# Patient Record
Sex: FEMALE | Race: BLACK OR AFRICAN AMERICAN | NOT HISPANIC OR LATINO | Employment: FULL TIME | URBAN - METROPOLITAN AREA
[De-identification: names, ages, dates, MRNs, and addresses within clinical notes are randomized per-mention and may not be internally consistent; named-entity substitution may affect disease eponyms.]

---

## 2020-06-11 ENCOUNTER — HOSPITAL ENCOUNTER (EMERGENCY)
Facility: HOSPITAL | Age: 55
Discharge: HOME | End: 2020-06-11
Attending: EMERGENCY MEDICINE
Payer: COMMERCIAL

## 2020-06-11 VITALS
SYSTOLIC BLOOD PRESSURE: 147 MMHG | TEMPERATURE: 98 F | OXYGEN SATURATION: 98 % | BODY MASS INDEX: 40.4 KG/M2 | WEIGHT: 228 LBS | HEIGHT: 63 IN | RESPIRATION RATE: 18 BRPM | HEART RATE: 87 BPM | DIASTOLIC BLOOD PRESSURE: 73 MMHG

## 2020-06-11 DIAGNOSIS — M79.675 PAIN OF TOE OF LEFT FOOT: Primary | ICD-10-CM

## 2020-06-11 LAB — POCT GLUCOSE: 131 MG/DL (ref 70–110)

## 2020-06-11 PROCEDURE — 82962 GLUCOSE BLOOD TEST: CPT | Mod: ER

## 2020-06-11 PROCEDURE — 99284 EMERGENCY DEPT VISIT MOD MDM: CPT | Mod: 25,ER

## 2020-06-11 RX ORDER — IBUPROFEN 600 MG/1
600 TABLET ORAL EVERY 6 HOURS PRN
Qty: 12 TABLET | Refills: 0 | Status: SHIPPED | OUTPATIENT
Start: 2020-06-11 | End: 2020-07-21 | Stop reason: ALTCHOICE

## 2020-06-11 RX ORDER — ACETAMINOPHEN 325 MG/1
650 TABLET ORAL EVERY 6 HOURS PRN
Qty: 20 TABLET | Refills: 0 | OUTPATIENT
Start: 2020-06-11 | End: 2024-01-07

## 2020-06-11 NOTE — ED PROVIDER NOTES
Encounter Date: 6/11/2020    SCRIBE #1 NOTE: I, Sun Barroso, am scribing for, and in the presence of,  Dr. Crews. I have scribed the following portions of the note - Other sections scribed: HPI, ROS, PE.       History     Chief Complaint   Patient presents with    Toe Pain     started last friday. states tried home remedies (soaking with epsom salt and eucalyptus) without relief. LEFT GREAT TOE, c/o of swelling and pain denies trauma.      Sofia Dunbar is a 54 y.o. female who presents to the ED complaining of left great toe pain x 1 week. Tried soaking her feet in epsom salt with no relief. Denies injury or trauma. Reports she works at [employer] and is on her feet all day. Wears 6 month old tennis shoes. Denies fever, CP, SOB, nausea, and vomiting.  Pain 6 at 10, nonradiating, right toe, worse with walking on it, relieved with not walking on it.  Patient is not take anything to try to help out with the pain.  No fevers chills.    The history is provided by the patient. No  was used.     Review of patient's allergies indicates:   Allergen Reactions    Iodine and iodide containing products Swelling    Shellfish containing products Swelling     History reviewed. No pertinent past medical history.  No past surgical history on file.  No family history on file.  Social History     Tobacco Use    Smoking status: Current Some Day Smoker   Substance Use Topics    Alcohol use: Yes     Comment: social    Drug use: Not on file     Review of Systems   Constitutional: Negative for fever.   HENT: Negative for sore throat.    Respiratory: Negative for shortness of breath.    Cardiovascular: Negative for chest pain.   Gastrointestinal: Negative for nausea and vomiting.   Genitourinary: Negative for dysuria.   Musculoskeletal: Positive for arthralgias. Negative for back pain.   Skin: Negative for rash.   Neurological: Negative for weakness.   Hematological: Does not bruise/bleed easily.   All other  systems reviewed and are negative.      Physical Exam     Initial Vitals [06/11/20 1043]   BP Pulse Resp Temp SpO2   (!) 147/73 87 18 98.2 °F (36.8 °C) 98 %      MAP       --         Physical Exam    Nursing note and vitals reviewed.  Constitutional: She appears well-developed and well-nourished.   HENT:   Head: Normocephalic and atraumatic.   Eyes: EOM are normal. Pupils are equal, round, and reactive to light.   Neck: Normal range of motion. No JVD present.   Cardiovascular: Normal rate and regular rhythm.   Pulmonary/Chest: Breath sounds normal. No respiratory distress.   Abdominal: Soft. Bowel sounds are normal. She exhibits no distension.   Musculoskeletal: Normal range of motion. She exhibits no edema.   Tenderness to the left great toe with mild swelling.  Callus formation on the left great toe on the medial aspect which is significantly larger than on the right foot.  Full range of motion.  Good cap refill.  Does not feel warmer to the touch to the on the other side.  No fluctuance.  No abscess.  Does not show any cellulitis.   Neurological: She is alert and oriented to person, place, and time. She has normal strength. GCS score is 15. GCS eye subscore is 4. GCS verbal subscore is 5. GCS motor subscore is 6.   Skin: Skin is warm. Capillary refill takes less than 2 seconds.   Psychiatric: She has a normal mood and affect. Thought content normal.         ED Course   Procedures  Labs Reviewed   POCT GLUCOSE - Abnormal; Notable for the following components:       Result Value    POCT Glucose 131 (*)     All other components within normal limits          Imaging Results          X-Ray Toe 2 or More Views Left (Final result)  Result time 06/11/20 11:08:53    Final result by Israel Henderson MD (06/11/20 11:08:53)                 Impression:      No fracture identified.      Electronically signed by: Israel Henderson MD  Date:    06/11/2020  Time:    11:08             Narrative:    EXAMINATION:  XR TOE 2 OR MORE VIEWS  LEFT    CLINICAL HISTORY:  pain;    TECHNIQUE:  Three views of the left toes were performed    COMPARISON:  None.    FINDINGS:  The alignment is within normal limits.  No fracture.  No marrow replacement process.  No radiopaque foreign body.                                 Medical Decision Making:   History:   Old Medical Records: I decided to obtain old medical records.  Initial Assessment:   54-year-old female coming in secondary to toe pain.  I think this is likely some arthritis in the toe secondary to walking for long periods of time with older shoes that are not giving her proper support.  X-ray reassuring.  IM ketorolac for pain.  No signs of cellulitis, abscess, paronychia, felon, septic joint.  Good cap refill.  Neurovascular intact so I do not think any arterial venous compromise.  Discharged with ibuprofen and Tylenol.  Patient is going to the store after this to change her shoes. I discussed with the patient the diagnosis, treatment plan, indications for return to the emergency department, and for expected follow-up. The patient verbalized an understanding. The patient is asked if there are any questions or concerns. We discuss the case, until all issues are addressed to the patient's satisfaction. Patient understands and is agreeable to the plan.   Earnest Crews    Clinical Tests:   Lab Tests: Ordered and Reviewed  Radiological Study: Ordered and Reviewed            Scribe Attestation:   Scribe #1: I performed the above scribed service and the documentation accurately describes the services I performed. I attest to the accuracy of the note.    I, Earnest Crews, personally performed the services described in this documentation. All medical record entries made by the scribe were at my direction and in my presence. I have reviewed the chart and agree that the record reflects my personal performance and is accurate and complete.                        Clinical Impression:     1. Pain of toe of left foot                 ED Disposition Condition    Discharge Stable        ED Prescriptions     Medication Sig Dispense Start Date End Date Auth. Provider    ibuprofen (ADVIL,MOTRIN) 600 MG tablet Take 1 tablet (600 mg total) by mouth every 6 (six) hours as needed. 12 tablet 6/11/2020  Earnest Crews MD    acetaminophen (TYLENOL) 325 MG tablet Take 2 tablets (650 mg total) by mouth every 6 (six) hours as needed. 20 tablet 6/11/2020  Earnest Crews MD        Follow-up Information     Follow up With Specialties Details Why Contact Info    [facility] Schedule an appointment as soon as possible for a visit in 2 days  [address] [facility] [address] [phone]      Kathy Alvarenga DPM Podiatry, Wound Care Schedule an appointment as soon as possible for a visit in 2 days  [address] [facility] [id number] [phone]                                       Earnest Crews MD  06/11/20 8834

## 2020-07-21 ENCOUNTER — OFFICE VISIT (OUTPATIENT)
Dept: PODIATRY | Facility: CLINIC | Age: 55
End: 2020-07-21
Payer: COMMERCIAL

## 2020-07-21 VITALS — WEIGHT: 228 LBS | BODY MASS INDEX: 40.4 KG/M2 | HEIGHT: 63 IN

## 2020-07-21 DIAGNOSIS — M25.572 ARTHRALGIA OF FOOT, LEFT: ICD-10-CM

## 2020-07-21 DIAGNOSIS — M79.672 FOOT PAIN, LEFT: Primary | ICD-10-CM

## 2020-07-21 DIAGNOSIS — M21.6X2 ACQUIRED EQUINUS DEFORMITY OF BOTH FEET: ICD-10-CM

## 2020-07-21 DIAGNOSIS — M20.5X2 HALLUX LIMITUS, ACQUIRED, LEFT: ICD-10-CM

## 2020-07-21 DIAGNOSIS — M21.6X1 ACQUIRED EQUINUS DEFORMITY OF BOTH FEET: ICD-10-CM

## 2020-07-21 DIAGNOSIS — M20.5X1 HALLUX LIMITUS, ACQUIRED, RIGHT: ICD-10-CM

## 2020-07-21 PROCEDURE — 99203 OFFICE O/P NEW LOW 30 MIN: CPT | Mod: S$GLB,,, | Performed by: PODIATRIST

## 2020-07-21 PROCEDURE — 99203 PR OFFICE/OUTPT VISIT, NEW, LEVL III, 30-44 MIN: ICD-10-PCS | Mod: S$GLB,,, | Performed by: PODIATRIST

## 2020-07-21 PROCEDURE — 3008F BODY MASS INDEX DOCD: CPT | Mod: CPTII,S$GLB,, | Performed by: PODIATRIST

## 2020-07-21 PROCEDURE — 99999 PR PBB SHADOW E&M-EST. PATIENT-LVL IV: CPT | Mod: PBBFAC,,, | Performed by: PODIATRIST

## 2020-07-21 PROCEDURE — 3008F PR BODY MASS INDEX (BMI) DOCUMENTED: ICD-10-PCS | Mod: CPTII,S$GLB,, | Performed by: PODIATRIST

## 2020-07-21 PROCEDURE — 99999 PR PBB SHADOW E&M-EST. PATIENT-LVL IV: ICD-10-PCS | Mod: PBBFAC,,, | Performed by: PODIATRIST

## 2020-07-21 RX ORDER — MELOXICAM 15 MG/1
15 TABLET ORAL DAILY
Qty: 30 TABLET | Refills: 1 | Status: SHIPPED | OUTPATIENT
Start: 2020-07-21 | End: 2021-07-21

## 2020-07-21 NOTE — PROGRESS NOTES
Subjective:      Patient ID: Sofia Dunbar is a 54 y.o. female.    Chief Complaint: Foot Problem (left foot ) and Follow-up      Sofia Dunbar is a 54 y.o. female who presents to the podiatry clinic  with complaint of  left sharp and aching foot pain, especially with prolonged standing in certain shoes.  She relates that pain began in late May and she presented to the emergency room with a painful swollen great toe joint on 06/11/2020. Reports she works at [employer] and is on her feet all day. Wears 6 month old tennis shoes. Denies fever, CP, SOB, nausea, and vomiting.  Precipitating event: none known.  History of injury: no  Symptoms have gradually improved. Patient has had no prior foot problems. Patients rates pain 0/10 on pain scale.    Shoe gear: Slip-on shoes  Hours on Feet: 8+    Patient Active Problem List   Diagnosis    CHF (congestive heart failure)    Essential hypertension    Pure hypercholesterolemia    Morbid obesity with BMI of 40.0-44.9, adult    Tobacco use       Current Outpatient Medications on File Prior to Visit   Medication Sig Dispense Refill    acetaminophen (TYLENOL) 325 MG tablet Take 2 tablets (650 mg total) by mouth every 6 (six) hours as needed. 20 tablet 0    amlodipine (NORVASC) 5 MG tablet       aspirin (ECOTRIN) 81 MG EC tablet Take 81 mg by mouth once daily.      atorvastatin (LIPITOR) 20 MG tablet       carvedilol (COREG) 12.5 MG tablet       furosemide (LASIX) 20 MG tablet       potassium chloride (KLOR-CON) 10 MEQ TbSR Take 20 mEq by mouth once daily.      [DISCONTINUED] ibuprofen (ADVIL,MOTRIN) 600 MG tablet Take 1 tablet (600 mg total) by mouth every 6 (six) hours as needed. 12 tablet 0     No current facility-administered medications on file prior to visit.        Review of patient's allergies indicates:   Allergen Reactions    Iodine and iodide containing products Swelling    Shellfish containing products Swelling       History reviewed. No pertinent surgical  "history.    History reviewed. No pertinent family history.    Social History     Socioeconomic History    Marital status: Single     Spouse name: Not on file    Number of children: Not on file    Years of education: Not on file    Highest education level: Not on file   Occupational History    Not on file   Social Needs    Financial resource strain: Not on file    Food insecurity     Worry: Not on file     Inability: Not on file    Transportation needs     Medical: Not on file     Non-medical: Not on file   Tobacco Use    Smoking status: Current Some Day Smoker   Substance and Sexual Activity    Alcohol use: Yes     Comment: social    Drug use: Not on file    Sexual activity: Not on file   Lifestyle    Physical activity     Days per week: Not on file     Minutes per session: Not on file    Stress: Not on file   Relationships    Social connections     Talks on phone: Not on file     Gets together: Not on file     Attends [employer] service: Not on file     Active member of club or organization: Not on file     Attends meetings of clubs or organizations: Not on file     Relationship status: Not on file   Other Topics Concern    Not on file   Social History Narrative    Not on file       Review of Systems   Constitution: Negative for chills and fever.   Cardiovascular: Negative for claudication and leg swelling.   Respiratory: Negative for cough and shortness of breath.    Skin: Positive for dry skin and nail changes. Negative for itching and rash.   Musculoskeletal: Positive for arthritis, back pain, joint pain and myalgias. Negative for falls, joint swelling and muscle weakness.   Gastrointestinal: Negative for diarrhea, nausea and vomiting.   Neurological: Positive for paresthesias. Negative for numbness, tremors and weakness.   Psychiatric/Behavioral: Negative for altered mental status and hallucinations.           Objective:      Vitals:    07/21/20 1415   Weight: 103.4 kg (228 lb)   Height: 5' 3" " (1.6 m)   PainSc: 0-No pain       Physical Exam  Nursing note reviewed.   Constitutional:       General: She is not in acute distress.     Appearance: She is not toxic-appearing or diaphoretic.   Cardiovascular:      Pulses:           Dorsalis pedis pulses are 2+ on the right side and 2+ on the left side.        Posterior tibial pulses are 2+ on the right side and 2+ on the left side.   Pulmonary:      Effort: No respiratory distress.   Musculoskeletal:      Right ankle: She exhibits decreased range of motion. She exhibits no swelling. No tenderness. No lateral malleolus, no medial malleolus, no AITFL, no CF ligament and no posterior TFL tenderness found. Achilles tendon exhibits no pain, no defect and normal Camarena's test results.      Left ankle: She exhibits decreased range of motion. She exhibits no swelling. No tenderness. No lateral malleolus, no medial malleolus, no AITFL, no CF ligament and no posterior TFL tenderness found. Achilles tendon exhibits no pain, no defect and normal Camarena's test results.      Right foot: No bony tenderness.      Left foot: No bony tenderness.      Comments: There is equinus deformity bilateral with decreased dorsiflexion at the ankle joint bilateral.  Shoes reveals lateral heel counter wear bilateral     Decreased first MPJ range of motion both weightbearing and nonweightbearing, no crepitus observed the first MP joint, + dorsal flag sign. Mild  bunion deformity is observed .    Patient has hammertoes of digits 2-5 bilateral partially reducible      Skin:     General: Skin is warm and dry.      Coloration: Skin is not pale.      Findings: No bruising, burn, laceration, lesion or rash.      Nails: There is no clubbing.     Neurological:      Sensory: No sensory deficit.      Motor: No tremor, atrophy or abnormal muscle tone.      Deep Tendon Reflexes: Reflexes are normal and symmetric.   Psychiatric:         Attention and Perception: She is attentive.         Mood and  Affect: Mood is not anxious. Affect is not inappropriate.         Speech: She is communicative. Speech is not slurred.         Behavior: Behavior is not combative.               Assessment:       Encounter Diagnoses   Name Primary?    Foot pain, left Yes    Hallux limitus, acquired, left     Hallux limitus, acquired, right     Arthralgia of foot, left     Acquired equinus deformity of both feet          Plan:       Sofia was seen today for foot problem and follow-up.    Diagnoses and all orders for this visit:    Foot pain, left    Hallux limitus, acquired, left    Hallux limitus, acquired, right    Arthralgia of foot, left  -     Ambulatory referral/consult to Physical/Occupational Therapy; Future    Acquired equinus deformity of both feet    Other orders  -     meloxicam (MOBIC) 15 MG tablet; Take 1 tablet (15 mg total) by mouth once daily. 1 pill per day everyday for the next 3 weeks with food      I counseled the patient on her conditions, their implications and medical management.    Personally interpreted and reviewed x-ray with patient.    I did  the patient in detail regarding surgical and conservative treatment measures for hallux limitus. I informed the  patient that the majority of pain is secondary to an arthritic joint with decreased joint spaces. Informed patient that outside of surgical intervention the main goal of therapy is to decreased the  range of motion at the first MPJ joint. This can be done so by utilizing either and extremely hard soled nonflexible shoe or forefoot rocker    I gave written and verbal instructions on stretching exercises. Patient expressed understanding. Discussed icing the affected area as needed and also wearing appropriate shoe gear and avoiding flats, slippers, sandals, and going barefoot. My recommendation for OTC supports is [employer]otic[employer]. Patient instructed on adequate icing techniques. Patient should ice the affected area at least once per day x  10 minutes for 10 days I advised the patient that extra icing would also be beneficial to ensure adequate anti inflammatory effect. We also discussed cortisone injections and NSAID therapy. Mobic prescribed. Patient was instructed on dosing information. Discontinue if adverse effects occur     Referral placed to PT to aid in increasing ROM and breaking up of scar tissue. Dry needling requested    RTC if no improvement, at this time patient will receive cortisone injections. Patient is amenable to plan.

## 2020-07-21 NOTE — PATIENT INSTRUCTIONS
Recommend lotions: eucerin, eucerin for diabetics, aquaphor, A&D ointment, gold bond for diabetics, sween, Adair's Bees all purpose baby ointment,  urea 40 with aloe (found on amazon.com)    Shoe recommendations: (try 6pm.com, zappos.Crowdmark , nordstromrack.Crowdmark, or shoes.Crowdmark for discounted prices) you can visit DSW shoes in [place]  or [facility] in the [facility] (there are also several shoe brand outlets in the [facility])    Asics (GT 2000 or gel foundations), new balance stability type shoes (such as the 940 series), saucony (stabil c3),  Deleon (GTS or Beast or transcend), propet (tennis shoe)    Sofft Brand (women) Tarun&Emery (men), clarks, crocs, aerosoles, naturalizers, SAS, ecco, born, renny jennings, rockports (dress shoes)    Vionic, burkenstocks, fitflops, propet (sandals)  Nike comfort thong sandals, crocs, propet (house shoes)    Nail Home remedy:  Vicks Vapor rub to nails for easier manageability      Occasional soaks for 15-20 mins in luke warm water with 1/2 cup of listerine and 1 cup of apple cider vinegar are ok You may add several drops of oil of oregano or tea tree oil as well        What Is Arthritis in the Foot?  Degenerative arthritis is a condition that slowly wears away joints, the area where bones meet and move. In the beginning, you may notice that the affected joint seems stiff. It may even ache. As the joint lining (cartilage) breaks down, the bones rub against each other, causing pain and swelling. Over time, small pieces of rough or splintered bone (bone spurs) develop, and the joints range of motion becomes limited. But movement doesnt have to cause pain. The effects of arthritis can be reduced.    The big-toe joint  When arthritis affects your big toe, your foot hurts when it pushes off the ground. Arthritis often appears in the big-toe joint along with a bunion (a bony bump at the side of the joint) or a bone spur on top of the joint.    Other joints  When arthritis affects the rear  or midfoot joints, you feel pain when you put weight on your foot. Arthritis may affect the joint where the ankle and foot meet. It may also affect other joints nearby.  Date Last Reviewed: 7/1/2016 © 2000-2017 [employer]. [address] [place] [address], [place] [address]. All rights reserved. This information is not intended as a substitute for professional medical care. Always follow your healthcare professional's instructions.        Treating Arthritis in the Foot  If your symptoms are mild, medications may be enough to reduce pain and swelling. For more severe arthritis, surgery may be needed to improve the condition of the joint.    Medicine  Your doctor may prescribe medicine--pills or injections--to limit pain and swelling. Ice, aspirin, acetaminophen, or ibuprofen may help relieve mild symptoms that occur after activity.  Surgery and bone trimming  To ease movement and reduce pain, your doctor may trim damaged bone. If arthritis is severe, the joint may be fused or removed. If the bone is not damaged too badly, your doctor may simply shave away bone spurs. Any excess bone growth related to a bunion may also be trimmed.  Fusing joints  If damage is more severe, your doctor may fuse the joint to prevent the bones from rubbing. Afterward, staples, plates, or screws may hold the bones in place so they heal properly. In some cases, the joint may be removed and replaced with an implant.  After surgery  During the early stages of recovery, your foot is likely to be bandaged and immobilized for a while. For best results, follow up with your doctor as scheduled. These visits help ensure that your foot heals properly.  As you heal  After surgery, youll be told how to care for your incision and how soon to begin walking on the foot. Until the foot can bear weight, you may need to walk with crutches or a cane.  For surgery on the big toe, your foot may be splinted to limit movement for several weeks. Despite  this, you should be able to walk soon after surgery.  For surgery on rear or midfoot joints, you may need to wear a cast or surgical shoe. These joints are fairly large, so full recovery may take a few months. Once the bone has healed, any staples, plates, or screws may be removed.  Date Last Reviewed: 7/1/2016 © 2000-2017 [employer]. [address] [place] [address], [place] [address]. All rights reserved. This information is not intended as a substitute for professional medical care. Always follow your healthcare professional's instructions.        Foot Surgery: Degenerative Joint Disease    Degenerative joint disease (arthritis) often happens in the joint of a big toe. This bone growth may cause pain and stiffness in the joint. Left untreated, arthritis can break down the cartilage and destroy the joint. Your treatment choices depend on how damaged your joint is. There are many nonsurgical treatments, but if these are not helpful, surgery may be considered.    Cheilectomy  This is done when the arthritic joint and cartilage can be saved. A bone spur caused by arthritis may be symptomatic on the top of the big toe joint. The procedure involves removing this bone spur, usually with a small part of the top of the joint itself.  You will need to wear a surgical shoe for several weeks. Once the foot heals, joint movement is restored.    Fusion  In fusion, the cartilage and some bone on both sides of the joint are removed. Then, the big toe and metatarsal bones are held together with staples, screws, or a plate and screws. Your foot may be placed in a cast. While you heal, you will be asked not to bear weight on this foot. You may also need crutches for several weeks. Because the joint has been removed, your toe will be less flexible.    Arthroplasty  During surgery, bone growth caused by the arthritis is trimmed, and part of the joint is removed. A pin can be used to align the bones and to keep them from  touching. The pin is removed after several weeks. In some cases, the entire joint may be replaced with an implant. You may have to wear a splint or a surgical shoe for several weeks. When healed, the bones become connected with scar tissue.  Date Last Reviewed: 10/15/2015  © 1310-0949 [employer]. [address] [place] [address], [place] [address]. All rights reserved. This information is not intended as a substitute for professional medical care. Always follow your healthcare professional's instructions.

## 2021-03-26 ENCOUNTER — TELEPHONE (OUTPATIENT)
Dept: ENDOSCOPY | Facility: HOSPITAL | Age: 56
End: 2021-03-26

## 2021-04-16 ENCOUNTER — PATIENT MESSAGE (OUTPATIENT)
Dept: RESEARCH | Facility: HOSPITAL | Age: 56
End: 2021-04-16

## 2021-04-27 ENCOUNTER — TELEPHONE (OUTPATIENT)
Dept: ENDOSCOPY | Facility: HOSPITAL | Age: 56
End: 2021-04-27

## 2021-05-04 ENCOUNTER — OFFICE VISIT (OUTPATIENT)
Dept: OPTOMETRY | Facility: CLINIC | Age: 56
End: 2021-05-04
Payer: COMMERCIAL

## 2021-05-04 DIAGNOSIS — H52.4 MYOPIA WITH PRESBYOPIA OF BOTH EYES: ICD-10-CM

## 2021-05-04 DIAGNOSIS — H52.13 MYOPIA WITH PRESBYOPIA OF BOTH EYES: ICD-10-CM

## 2021-05-04 DIAGNOSIS — H25.013 CORTICAL SENILE CATARACT, BILATERAL: Primary | ICD-10-CM

## 2021-05-04 PROCEDURE — 92004 PR EYE EXAM, NEW PATIENT,COMPREHESV: ICD-10-PCS | Mod: S$GLB,,, | Performed by: OPTOMETRIST

## 2021-05-04 PROCEDURE — 99999 PR PBB SHADOW E&M-EST. PATIENT-LVL II: ICD-10-PCS | Mod: PBBFAC,,, | Performed by: OPTOMETRIST

## 2021-05-04 PROCEDURE — 92015 PR REFRACTION: ICD-10-PCS | Mod: S$GLB,,, | Performed by: OPTOMETRIST

## 2021-05-04 PROCEDURE — 92004 COMPRE OPH EXAM NEW PT 1/>: CPT | Mod: S$GLB,,, | Performed by: OPTOMETRIST

## 2021-05-04 PROCEDURE — 1126F PR PAIN SEVERITY QUANTIFIED, NO PAIN PRESENT: ICD-10-PCS | Mod: S$GLB,,, | Performed by: OPTOMETRIST

## 2021-05-04 PROCEDURE — 99999 PR PBB SHADOW E&M-EST. PATIENT-LVL II: CPT | Mod: PBBFAC,,, | Performed by: OPTOMETRIST

## 2021-05-04 PROCEDURE — 1126F AMNT PAIN NOTED NONE PRSNT: CPT | Mod: S$GLB,,, | Performed by: OPTOMETRIST

## 2021-05-04 PROCEDURE — 92015 DETERMINE REFRACTIVE STATE: CPT | Mod: S$GLB,,, | Performed by: OPTOMETRIST

## 2021-06-02 DIAGNOSIS — I50.22 CHRONIC SYSTOLIC CONGESTIVE HEART FAILURE: Primary | ICD-10-CM

## 2023-03-16 ENCOUNTER — OFFICE VISIT (OUTPATIENT)
Dept: PODIATRY | Facility: CLINIC | Age: 58
End: 2023-03-16
Payer: COMMERCIAL

## 2023-03-16 ENCOUNTER — HOSPITAL ENCOUNTER (OUTPATIENT)
Dept: RADIOLOGY | Facility: HOSPITAL | Age: 58
Discharge: HOME | End: 2023-03-16
Attending: PODIATRIST
Payer: COMMERCIAL

## 2023-03-16 VITALS — WEIGHT: 227.94 LBS | BODY MASS INDEX: 40.39 KG/M2 | HEIGHT: 63 IN

## 2023-03-16 DIAGNOSIS — M79.671 RIGHT FOOT PAIN: Primary | ICD-10-CM

## 2023-03-16 DIAGNOSIS — M79.671 RIGHT FOOT PAIN: ICD-10-CM

## 2023-03-16 DIAGNOSIS — M76.71 PERONEAL TENDINITIS OF RIGHT LOWER EXTREMITY: ICD-10-CM

## 2023-03-16 PROCEDURE — 3008F BODY MASS INDEX DOCD: CPT | Mod: CPTII,S$GLB,, | Performed by: PODIATRIST

## 2023-03-16 PROCEDURE — 73630 X-RAY EXAM OF FOOT: CPT | Mod: 26,RT,, | Performed by: RADIOLOGY

## 2023-03-16 PROCEDURE — 99214 OFFICE O/P EST MOD 30 MIN: CPT | Mod: S$GLB,,, | Performed by: PODIATRIST

## 2023-03-16 PROCEDURE — 4010F ACE/ARB THERAPY RXD/TAKEN: CPT | Mod: CPTII,S$GLB,, | Performed by: PODIATRIST

## 2023-03-16 PROCEDURE — 99999 PR PBB SHADOW E&M-EST. PATIENT-LVL III: CPT | Mod: PBBFAC,,, | Performed by: PODIATRIST

## 2023-03-16 PROCEDURE — 3008F PR BODY MASS INDEX (BMI) DOCUMENTED: ICD-10-PCS | Mod: CPTII,S$GLB,, | Performed by: PODIATRIST

## 2023-03-16 PROCEDURE — 99214 PR OFFICE/OUTPT VISIT, EST, LEVL IV, 30-39 MIN: ICD-10-PCS | Mod: S$GLB,,, | Performed by: PODIATRIST

## 2023-03-16 PROCEDURE — 4010F PR ACE/ARB THEARPY RXD/TAKEN: ICD-10-PCS | Mod: CPTII,S$GLB,, | Performed by: PODIATRIST

## 2023-03-16 PROCEDURE — 73630 X-RAY EXAM OF FOOT: CPT | Mod: TC,FY,PO,RT

## 2023-03-16 PROCEDURE — 73630 XR FOOT COMPLETE 3 VIEW RIGHT: ICD-10-PCS | Mod: 26,RT,, | Performed by: RADIOLOGY

## 2023-03-16 PROCEDURE — 99999 PR PBB SHADOW E&M-EST. PATIENT-LVL III: ICD-10-PCS | Mod: PBBFAC,,, | Performed by: PODIATRIST

## 2023-03-16 PROCEDURE — 1159F MED LIST DOCD IN RCRD: CPT | Mod: CPTII,S$GLB,, | Performed by: PODIATRIST

## 2023-03-16 PROCEDURE — 1159F PR MEDICATION LIST DOCUMENTED IN MEDICAL RECORD: ICD-10-PCS | Mod: CPTII,S$GLB,, | Performed by: PODIATRIST

## 2023-03-16 RX ORDER — AMMONIUM LACTATE 12 G/100G
1 CREAM TOPICAL DAILY
Qty: 140 G | Refills: 5 | Status: SHIPPED | OUTPATIENT
Start: 2023-03-16

## 2023-03-16 RX ORDER — KETOCONAZOLE 20 MG/G
CREAM TOPICAL DAILY
Qty: 60 G | Refills: 3 | Status: SHIPPED | OUTPATIENT
Start: 2023-03-16

## 2023-03-16 RX ORDER — MELOXICAM 15 MG/1
15 TABLET ORAL DAILY
Qty: 20 TABLET | Refills: 0 | OUTPATIENT
Start: 2023-03-16 | End: 2024-01-07

## 2023-03-16 RX ORDER — DICLOFENAC SODIUM 10 MG/G
2 GEL TOPICAL DAILY
Qty: 100 G | Refills: 3 | Status: SHIPPED | OUTPATIENT
Start: 2023-03-16

## 2023-03-16 NOTE — PATIENT INSTRUCTIONS
Shoe recommendations: (try 6pm.com, zappos.com , nordstromrack.com, or shoes.com for discounted prices) you can visit DSW shoes in [place] as well    Asics (GT 1000 or gel foundations), new balance, priscilla (stabil c3),  Pancho (transcend), vionic, propet, Hoka (One)  (tennis shoe)    soft brand, clarks, crocs, naot, aerosoles, naturalizers, SAS, ecco, jani, sin fried (dress shoes)    Vionic, volitiles, burkenstocks, fitflops, naot, propet (sandals)    Nike comfort thong sandals, crocs,dr comfort  (house shoes)

## 2023-03-16 NOTE — PROGRESS NOTES
Subjective:      Patient ID: Sofia Dunbar is a 57 y.o. female.    Chief Complaint: Foot Pain (Right foot)    Sofia is a 57 y.o. female who presents to the podiatry clinic  with complaint of  right foot pain. Onset of the symptoms was several weeks ago. Precipitating event: none known. Current symptoms include: ability to bear weight, but with some pain. Aggravating factors: any weight bearing. Symptoms have progressed to a point and plateaued. Patient has had no prior foot problems. Evaluation to date: none. Treatment to date: none. Patients rates pain 5/10 on pain scale.    Review of Systems   Constitutional: Negative for chills.   Cardiovascular:  Negative for chest pain and claudication.   Respiratory:  Negative for cough.    Skin:  Positive for color change, dry skin and nail changes.   Musculoskeletal:  Positive for joint pain.   Gastrointestinal:  Negative for nausea.   Neurological:  Positive for paresthesias. Negative for numbness.   Psychiatric/Behavioral:  The patient is not nervous/anxious.          Objective:      Physical Exam  Constitutional:       Appearance: She is well-developed.      Comments: Oriented to time, place, and person.   Cardiovascular:      Comments: DP and PT pulses are palpable bilaterally. 3 sec capillary refill time and toes and feet are warm to touch proximally .  There is  hair growth on the feet and toes b/l. There is no edema b/l. No spider veins or varicosities present b/l.     Musculoskeletal:      Comments: Equinus noted b/l ankles with < 10 deg DF noted. MMT 5/5 in DF/PF/Inv/Ev resistance with no reproduction of pain in any direction. Passive range of motion of ankle and pedal joints is painless b/l.  Right lateral foot pain 5th metatarsal base.    Feet:      Right foot:      Skin integrity: No callus or dry skin.      Left foot:      Skin integrity: No callus or dry skin.   Lymphadenopathy:      Comments: Negative lymphadenopathy bilateral popliteal fossa and tarsal  tunnel.   Skin:     Comments: No open lesions, lacerations or wounds noted.Interdigital spaces clean, dry and intact b/l. No erythema noted to b/l foot.    Xerosis B/L     Scaling dryness in a moccasin distribution is noted to the bilateral lower extremities          Neurological:      Mental Status: She is alert.      Comments: Light touch, proprioception, and sharp/dull sensation are all intact bilaterally. Protective threshold with the [place]ienstein monofilament is intact bilaterally.    Psychiatric:         Behavior: Behavior is cooperative.             Assessment:       Encounter Diagnoses   Name Primary?    Right foot pain Yes    Peroneal tendinitis of right lower extremity          Plan:       Sofia was seen today for foot pain.    Diagnoses and all orders for this visit:    Right foot pain  -     X-Ray Foot Complete Right; Future    Peroneal tendinitis of right lower extremity    Other orders  -     diclofenac sodium (VOLTAREN) 1 % Gel; Apply 2 g topically once daily.  -     meloxicam (MOBIC) 15 MG tablet; Take 1 tablet (15 mg total) by mouth once daily.  -     ammonium lactate 12 % Crea; Apply 1 application topically once daily.      I counseled the patient on her conditions, their implications and medical management.        Discussed conservative treatment with shoes of adequate dimensions, material, and style to alleviate symptoms and delay or prevent surgical intervention.    CAM boot dispensed and applied to affected foot. Advised to use at all times while weightbearing and ambulating even for few minutes. Advised to use sneaker style shoe in opposite foot to maintain balance. Ok to remove  at night but reapply if patient is to get up in the middle of the night. Verbalized understanding. Advised to use for 3 -4 weeks.     Rx Voltaren gel to be applied to affected area up to 3-4 x daily as needed for pain    Patient instructed on adequate icing techniques. Patient should ice the affected area at  least once per day x 10 minutes for 10 days . I advised the patient that extra icing would also be beneficial to ensure adequate anti inflammatory effect      Mobic prescribed. Patient was instructed on dosing information. Discontinue if adverse effects occur      Rx. Amlactin    Xray right foot ordered.     RTC PRN

## 2023-03-31 ENCOUNTER — TELEPHONE (OUTPATIENT)
Dept: PODIATRY | Facility: CLINIC | Age: 58
End: 2023-03-31
Payer: COMMERCIAL

## 2023-04-07 ENCOUNTER — HOSPITAL ENCOUNTER (EMERGENCY)
Facility: HOSPITAL | Age: 58
Discharge: HOME | End: 2023-04-07
Attending: EMERGENCY MEDICINE
Payer: COMMERCIAL

## 2023-04-07 VITALS
HEART RATE: 65 BPM | SYSTOLIC BLOOD PRESSURE: 131 MMHG | BODY MASS INDEX: 42.17 KG/M2 | DIASTOLIC BLOOD PRESSURE: 62 MMHG | OXYGEN SATURATION: 99 % | HEIGHT: 63 IN | TEMPERATURE: 99 F | RESPIRATION RATE: 18 BRPM | WEIGHT: 238 LBS

## 2023-04-07 DIAGNOSIS — R06.02 SOB (SHORTNESS OF BREATH): ICD-10-CM

## 2023-04-07 DIAGNOSIS — R07.9 CHEST PAIN, UNSPECIFIED TYPE: Primary | ICD-10-CM

## 2023-04-07 LAB
ALBUMIN SERPL-MCNC: 3.7 G/DL (ref 3.3–5.5)
ALP SERPL-CCNC: 65 U/L (ref 42–141)
BILIRUB SERPL-MCNC: 0.5 MG/DL (ref 0.2–1.6)
BUN SERPL-MCNC: 16 MG/DL (ref 7–22)
CALCIUM SERPL-MCNC: 10 MG/DL (ref 8–10.3)
CHLORIDE SERPL-SCNC: 103 MMOL/L (ref 98–108)
CREAT SERPL-MCNC: 0.9 MG/DL (ref 0.6–1.2)
CTP QC/QA: YES
GLUCOSE SERPL-MCNC: 109 MG/DL (ref 73–118)
INFLUENZA A ANTIGEN, POC: NEGATIVE
INFLUENZA B ANTIGEN, POC: NEGATIVE
POC ALT (SGPT): 13 U/L (ref 10–47)
POC AST (SGOT): 18 U/L (ref 11–38)
POC B-TYPE NATRIURETIC PEPTIDE: 34.3 PG/ML (ref 0–100)
POC CARDIAC TROPONIN I: 0 NG/ML (ref 0–0.08)
POC TCO2: 29 MMOL/L (ref 18–33)
POTASSIUM BLD-SCNC: 3.9 MMOL/L (ref 3.6–5.1)
PROTEIN, POC: 7.5 G/DL (ref 6.4–8.1)
SAMPLE: NORMAL
SARS-COV-2 RDRP RESP QL NAA+PROBE: NEGATIVE
SODIUM BLD-SCNC: 143 MMOL/L (ref 128–145)

## 2023-04-07 PROCEDURE — 93010 EKG 12-LEAD: ICD-10-PCS | Mod: ,,, | Performed by: INTERNAL MEDICINE

## 2023-04-07 PROCEDURE — 80053 COMPREHEN METABOLIC PANEL: CPT | Mod: ER

## 2023-04-07 PROCEDURE — 85025 COMPLETE CBC W/AUTO DIFF WBC: CPT | Mod: ER

## 2023-04-07 PROCEDURE — 93010 ELECTROCARDIOGRAM REPORT: CPT | Mod: ,,, | Performed by: INTERNAL MEDICINE

## 2023-04-07 PROCEDURE — 84484 ASSAY OF TROPONIN QUANT: CPT | Mod: ER

## 2023-04-07 PROCEDURE — 99285 EMERGENCY DEPT VISIT HI MDM: CPT | Mod: 25,ER

## 2023-04-07 PROCEDURE — 87804 INFLUENZA ASSAY W/OPTIC: CPT | Mod: ER

## 2023-04-07 PROCEDURE — 83880 ASSAY OF NATRIURETIC PEPTIDE: CPT | Mod: ER

## 2023-04-07 PROCEDURE — 93005 ELECTROCARDIOGRAM TRACING: CPT | Mod: ER

## 2023-04-07 NOTE — Clinical Note
"Sofia Alfredoe" Bettina was seen and treated in our emergency department on 4/7/2023.  She may return to work on 04/10/2023.       If you have any questions or concerns, please don't hesitate to call.      Erich CASTORENA    "

## 2023-04-07 NOTE — ED PROVIDER NOTES
Encounter Date: 4/7/2023       History     Chief Complaint   Patient presents with    Shortness of Breath     Pt reports episode chest pain and SOB waking her up this morning at 0300, c/o SOB and headache now, denies any recent illness     57 y.o. female No past medical history on file.   Htn, HLD, morbidy obestity, Grade 2 diastolic failure, ef 45-50% (echo 3/2/18), notes that at approx 3am she was awakened with midsternal chest pressure and sob. Notes symptoms lasted approx 5 min, then the pain resolved however the mild sob has remained. Denies pleuritic component. No f/c, n/v, diarrhea/dysuria or other c/o.    3/2/18 echo  ECG Rhythm:Sinus rhythm.   AORTA:Normal aortic root and proximal ascending aorta.   CHAMBER SIZE:Left atrial enlargement.  Rest of chambers are normal in size.   LEFT VENTRICLE:Overall left ventricular systolic function is mildly decreased with an EF of 45 - 50%.   RIGHT VENTRICLE:The right ventricular systolic function is normal.   AORTIC VALVE:Aortic valve is trileaflet and is mildly thickened/calcified with adequate opening.     There is doming of the aortic valve leaflets.     Moderate aortic regurgitation at the coaptation level.   MITRAL VALVE:Moderate mitral regurgitation (MR).     Navid Type IIIa (leaflet thickening) etiology for MR.     Posterior mitral leaflet with restricted motion.   TRICUSPID VALVE:The tricuspid valve is structurally normal without stenosis.     Trace tricuspid regurgitation.   PULMONIC VALVE:Normal pulmonic valve.   DIASTOLOGY:The estimated LVEDP using DBP-end AR jet method is ~ 22  mm Hg (elevated).   PULMONARY VEINS:The spectral Doppler pulmonary venous flow pattern is normal (S:D > 1).   SHUNT/DEFECT:An agitated saline contrast (bubble) study was performed with a single intravenous injection of 8 cc of agitated normal saline at rest.  No right-to-left shunt was identified.   CARDIOLOGY:    Electronically signed:  STAFF:ARIADNA FORBES M.D.   Fellow:BETHEL  LILLY AL     CONCLUSIONS   -----------   1. Moderate aortic regurgitation due to abnormal aortic valve.   2. Moderate mitral regurgitation due to abnormal mitral valve.   3. Negative bubble study for shunt.   4. HIGH LV FILLING PRESSURE.  Note: Multivalvular heart disease and resting hypertension. During study SBP at ~ 200.     3/2/18 Cath  IMPRESSIONS:     · No evidence of angiographically significant coronary artery disease.   · Systemic Hypertension and elevated Left Ventricular end diastolic   pressure   · Mild-Moderate pulmonary hypertension with elevated pulmonary capillary   wedge pressure consistent with WHO 2 diastolic heart failure   · No significant left to right shunt by screening oximetry   · No significant gradient across aortic valve        Review of patient's allergies indicates:   Allergen Reactions    Iodine and iodide containing products Swelling    Shellfish containing products Swelling     No past medical history on file.  No past surgical history on file.  No family history on file.  Social History     Tobacco Use    Smoking status: Some Days   Substance Use Topics    Alcohol use: Yes     Comment: social     Review of Systems   Constitutional:  Negative for fever.   HENT:  Negative for sore throat.    Respiratory:  Negative for shortness of breath.    Cardiovascular:  Negative for chest pain.   Gastrointestinal:  Negative for nausea.   Genitourinary:  Negative for dysuria.   Musculoskeletal:  Negative for back pain.   Skin:  Negative for rash.   Neurological:  Negative for weakness.   Hematological:  Does not bruise/bleed easily.   All other systems reviewed and are negative.    Physical Exam     Initial Vitals [04/07/23 1018]   BP Pulse Resp Temp SpO2   (!) 143/84 82 20 98.7 °F (37.1 °C) 99 %      MAP       --         Physical Exam    Nursing note and vitals reviewed.  Constitutional: She appears well-developed and well-nourished.   HENT:   Head: Normocephalic and atraumatic.   Eyes:  Conjunctivae and EOM are normal. Pupils are equal, round, and reactive to light.   Neck:   Normal range of motion.  Cardiovascular:  Normal rate.           Pulmonary/Chest: No respiratory distress.   Abdominal: She exhibits no distension.   Musculoskeletal:         General: Normal range of motion.      Cervical back: Normal range of motion.     Neurological: She is alert. No cranial nerve deficit. GCS score is 15. GCS eye subscore is 4. GCS verbal subscore is 5. GCS motor subscore is 6.   Skin: Skin is warm and dry.   Psychiatric: She has a normal mood and affect. Thought content normal.   No JVD, no LE edema    ED Course   Procedures    MEDICAL DECISION MAKING    After review of the patient's physical exam, ED testing, and history/symptoms, relevant labs, imaging, available outside records  a wide differential was considered including but not limited to: infectious, traumatic, vascular, toxicological , metabolic, malignant, ischemic, embolic, psychological, genetic, iatrogenic, idiopathic, medication reaction, substance dependence/intoxication/withdrawal, electrolyte or blood dyscrasia, and other etiologies.        labs/imaging/interventions include:       Medications - No data to display  Labs Reviewed   TROPONIN ISTAT   POCT CBC   SARS-COV-2 RDRP GENE    Narrative:     This test utilizes isothermal nucleic acid amplification technology to detect the SARS-CoV-2 RdRp nucleic acid segment. The analytical sensitivity (limit of detection) is 500 copies/swab.     A POSITIVE result is indicative of the presence of SARS-CoV-2 RNA; clinical correlation with patient history and other diagnostic information is necessary to determine patient infection status.    A NEGATIVE result means that SARS-CoV-2 nucleic acids are not present above the limit of detection. A NEGATIVE result should be treated as presumptive. It does not rule out the possibility of COVID-19 and should not be the sole basis for treatment decisions. If  COVID-19 is strongly suspected based on clinical and exposure history, re-testing using an alternate molecular assay should be considered.     This test is only for use under the Food and Drug Administration s Emergency Use Authorization (EUA).     Commercial kits are provided by [employer]. Performance characteristics of the EUA have been independently verified by [facility] Department of Pathology and Laboratory Medicine.   _________________________________________________________________   The authorized Fact Sheet for Healthcare Providers and the authorized Fact Sheet for Patients of the ID NOW COVID-19 are available on the FDA website:    https://www.fda.gov/media/985953/download      https://www.fda.gov/media/175827/download      POCT INFLUENZA A/B MOLECULAR   POCT CMP   POCT TROPONIN   POCT B-TYPE NATRIURETIC PEPTIDE (BNP)   POCT CMP   POCT RAPID INFLUENZA A/B   POCT B-TYPE NATRIURETIC PEPTIDE (BNP)      X-Ray Chest 1 View   Final Result      No acute abnormality.         Electronically signed by: Janina Fisher MD   Date:    04/07/2023   Time:    10:55          Labs Reviewed   TROPONIN ISTAT   POCT CBC   SARS-COV-2 RDRP GENE    Narrative:     This test utilizes isothermal nucleic acid amplification technology to detect the SARS-CoV-2 RdRp nucleic acid segment. The analytical sensitivity (limit of detection) is 500 copies/swab.     A POSITIVE result is indicative of the presence of SARS-CoV-2 RNA; clinical correlation with patient history and other diagnostic information is necessary to determine patient infection status.    A NEGATIVE result means that SARS-CoV-2 nucleic acids are not present above the limit of detection. A NEGATIVE result should be treated as presumptive. It does not rule out the possibility of COVID-19 and should not be the sole basis for treatment decisions. If COVID-19 is strongly suspected based on clinical and exposure history, re-testing using an alternate  molecular assay should be considered.     This test is only for use under the Food and Drug Administration s Emergency Use Authorization (EUA).     Commercial kits are provided by [employer]. Performance characteristics of the EUA have been independently verified by [facility] Department of Pathology and Laboratory Medicine.   _________________________________________________________________   The authorized Fact Sheet for Healthcare Providers and the authorized Fact Sheet for Patients of the ID NOW COVID-19 are available on the FDA website:    https://www.fda.gov/media/970760/download      https://www.fda.gov/media/092006/download      POCT INFLUENZA A/B MOLECULAR   POCT CMP   POCT TROPONIN   POCT B-TYPE NATRIURETIC PEPTIDE (BNP)   POCT CMP   POCT RAPID INFLUENZA A/B   POCT B-TYPE NATRIURETIC PEPTIDE (BNP)     EKG Readings: (Independently Interpreted)   Hr 70, sinus, nl axis/intervals, no duc/twi, non acute, no stemi.      Imaging Results              X-Ray Chest 1 View (Final result)  Result time 04/07/23 10:55:06      Final result by Janina Fisher MD (04/07/23 10:55:06)                   Impression:      No acute abnormality.      Electronically signed by: Janina Fisher MD  Date:    04/07/2023  Time:    10:55               Narrative:    EXAMINATION:  XR CHEST 1 VIEW    CLINICAL HISTORY:  Shortness of breath    TECHNIQUE:  Single frontal view of the chest was performed.    COMPARISON:  August 25, 2015    FINDINGS:  The lungs are free of lobar consolidation and alveolar edema, with normal appearance of pulmonary vasculature and no large pleural effusion or pneumothorax.    The cardiac silhouette is normal in size. The hilar and mediastinal contours are unremarkable.    Visualized osseous structures are stable with degenerative changes of the spine and acromioclavicular joints.                                       Medications - No data to display     Additional MDM:   Heart Score:     History:          Slightly suspicious.  ECG:             Normal  Age:               45-65 years  Risk factors: >= 3 risk factors or history of atherosclerotic disease  Troponin:       Less than or equal to normal limit  Final Score: 3    Will refer to cardiology              I have independently evaluated and interpreted all available labs and imaging to the extent of the scope of my practice.  The suspected diagnosis, treatment and plan were discussed with the patient. All questions or concerns have been addressed.    Note was created using voice recognition software. Note may have occasional typographical or grammatical errors , garbled syntax, and other bizarre constructions that may not have been identified and edited despite good lucía initial review prior to signing.          Clinical Impression:   Final diagnoses:  [R06.02] SOB (shortness of breath)  [R07.9] Chest pain, unspecified type (Primary)        ED Disposition Condition    Discharge Stable          ED Prescriptions    None       Follow-up Information       Follow up With Specialties Details Why Contact Info    [facility]   [address] [place] [address]  [phone]               Shravan Calderón MD  04/07/23 1703

## 2023-04-07 NOTE — DISCHARGE INSTRUCTIONS

## 2024-01-07 ENCOUNTER — HOSPITAL ENCOUNTER (EMERGENCY)
Facility: HOSPITAL | Age: 59
Discharge: HOME | End: 2024-01-07
Attending: EMERGENCY MEDICINE
Payer: COMMERCIAL

## 2024-01-07 VITALS
RESPIRATION RATE: 18 BRPM | HEART RATE: 77 BPM | BODY MASS INDEX: 38.97 KG/M2 | SYSTOLIC BLOOD PRESSURE: 161 MMHG | TEMPERATURE: 99 F | OXYGEN SATURATION: 99 % | WEIGHT: 220 LBS | DIASTOLIC BLOOD PRESSURE: 73 MMHG

## 2024-01-07 DIAGNOSIS — N20.0 KIDNEY STONE: ICD-10-CM

## 2024-01-07 DIAGNOSIS — R10.9 RIGHT FLANK PAIN: ICD-10-CM

## 2024-01-07 DIAGNOSIS — N30.01 ACUTE CYSTITIS WITH HEMATURIA: Primary | ICD-10-CM

## 2024-01-07 LAB
ALBUMIN SERPL-MCNC: 3.6 G/DL (ref 3.3–5.5)
ALBUMIN SERPL-MCNC: 3.6 G/DL (ref 3.3–5.5)
ALLENS TEST: ABNORMAL
ALP SERPL-CCNC: 59 U/L (ref 42–141)
ALP SERPL-CCNC: 64 U/L (ref 42–141)
BILIRUB SERPL-MCNC: 0.7 MG/DL (ref 0.2–1.6)
BILIRUB SERPL-MCNC: 0.7 MG/DL (ref 0.2–1.6)
BILIRUBIN, POC UA: NEGATIVE
BLOOD, POC UA: ABNORMAL
BUN SERPL-MCNC: 25 MG/DL (ref 7–22)
CALCIUM SERPL-MCNC: 9.7 MG/DL (ref 8–10.3)
CHLORIDE SERPL-SCNC: 104 MMOL/L (ref 98–108)
CLARITY, POC UA: CLEAR
COLOR, POC UA: YELLOW
CREAT SERPL-MCNC: 1.2 MG/DL (ref 0.6–1.2)
CTP QC/QA: YES
GLUCOSE SERPL-MCNC: 120 MG/DL (ref 73–118)
GLUCOSE, POC UA: NEGATIVE
HCO3 UR-SCNC: 27.3 MMOL/L (ref 24–28)
KETONES, POC UA: NEGATIVE
LDH SERPL L TO P-CCNC: 0.77 MMOL/L (ref 0.5–2.2)
LEUKOCYTE EST, POC UA: ABNORMAL
NITRITE, POC UA: NEGATIVE
PCO2 BLDA: 48.7 MMHG (ref 35–45)
PH SMN: 7.36 [PH] (ref 7.35–7.45)
PH UR STRIP: 5.5 [PH]
PO2 BLDA: 38 MMHG (ref 40–60)
POC ALT (SGPT): 14 U/L (ref 10–47)
POC ALT (SGPT): 14 U/L (ref 10–47)
POC AMYLASE: 30 U/L (ref 14–97)
POC AST (SGOT): 18 U/L (ref 11–38)
POC AST (SGOT): 18 U/L (ref 11–38)
POC BE: 1 MMOL/L
POC GGT: 20 U/L (ref 5–65)
POC SATURATED O2: 70 % (ref 95–100)
POC TCO2: 28 MMOL/L (ref 18–33)
POC TCO2: 29 MMOL/L (ref 24–29)
POTASSIUM BLD-SCNC: 3.9 MMOL/L (ref 3.6–5.1)
PROTEIN, POC UA: ABNORMAL
PROTEIN, POC: 7.5 G/DL (ref 6.4–8.1)
PROTEIN, POC: 7.6 G/DL (ref 6.4–8.1)
SAMPLE: ABNORMAL
SARS-COV-2 RDRP RESP QL NAA+PROBE: NEGATIVE
SITE: ABNORMAL
SODIUM BLD-SCNC: 141 MMOL/L (ref 128–145)
SPECIFIC GRAVITY, POC UA: 1.02
UROBILINOGEN, POC UA: 0.2 E.U./DL

## 2024-01-07 PROCEDURE — 87086 URINE CULTURE/COLONY COUNT: CPT | Performed by: NURSE PRACTITIONER

## 2024-01-07 PROCEDURE — 82803 BLOOD GASES ANY COMBINATION: CPT | Mod: ER

## 2024-01-07 PROCEDURE — 96365 THER/PROPH/DIAG IV INF INIT: CPT | Mod: ER

## 2024-01-07 PROCEDURE — 87635 SARS-COV-2 COVID-19 AMP PRB: CPT | Mod: ER | Performed by: NURSE PRACTITIONER

## 2024-01-07 PROCEDURE — 99284 EMERGENCY DEPT VISIT MOD MDM: CPT | Mod: 25,ER

## 2024-01-07 PROCEDURE — 96361 HYDRATE IV INFUSION ADD-ON: CPT | Mod: ER

## 2024-01-07 PROCEDURE — 25000003 PHARM REV CODE 250: Mod: ER | Performed by: NURSE PRACTITIONER

## 2024-01-07 PROCEDURE — 63600175 PHARM REV CODE 636 W HCPCS: Mod: ER | Performed by: NURSE PRACTITIONER

## 2024-01-07 PROCEDURE — 96375 TX/PRO/DX INJ NEW DRUG ADDON: CPT | Mod: ER

## 2024-01-07 RX ORDER — ONDANSETRON 4 MG/1
4 TABLET, ORALLY DISINTEGRATING ORAL EVERY 8 HOURS PRN
Qty: 20 TABLET | Refills: 0 | Status: SHIPPED | OUTPATIENT
Start: 2024-01-07 | End: 2024-01-14

## 2024-01-07 RX ORDER — AMOXICILLIN AND CLAVULANATE POTASSIUM 875; 125 MG/1; MG/1
1 TABLET, FILM COATED ORAL 2 TIMES DAILY
Qty: 20 TABLET | Refills: 0 | Status: SHIPPED | OUTPATIENT
Start: 2024-01-07 | End: 2024-01-17

## 2024-01-07 RX ORDER — TAMSULOSIN HYDROCHLORIDE 0.4 MG/1
0.4 CAPSULE ORAL
Status: COMPLETED | OUTPATIENT
Start: 2024-01-07 | End: 2024-01-07

## 2024-01-07 RX ORDER — TAMSULOSIN HYDROCHLORIDE 0.4 MG/1
0.4 CAPSULE ORAL DAILY
Qty: 10 CAPSULE | Refills: 0 | Status: SHIPPED | OUTPATIENT
Start: 2024-01-08 | End: 2024-01-18

## 2024-01-07 RX ORDER — KETOROLAC TROMETHAMINE 10 MG/1
10 TABLET, FILM COATED ORAL
Qty: 15 TABLET | Refills: 0 | Status: SHIPPED | OUTPATIENT
Start: 2024-01-07 | End: 2024-01-12

## 2024-01-07 RX ORDER — OXYCODONE AND ACETAMINOPHEN 5; 325 MG/1; MG/1
1 TABLET ORAL EVERY 6 HOURS PRN
Qty: 12 TABLET | Refills: 0 | Status: SHIPPED | OUTPATIENT
Start: 2024-01-07 | End: 2024-01-10

## 2024-01-07 RX ORDER — KETOROLAC TROMETHAMINE 30 MG/ML
15 INJECTION, SOLUTION INTRAMUSCULAR; INTRAVENOUS
Status: COMPLETED | OUTPATIENT
Start: 2024-01-07 | End: 2024-01-07

## 2024-01-07 RX ADMIN — TAMSULOSIN HYDROCHLORIDE 0.4 MG: 0.4 CAPSULE ORAL at 12:01

## 2024-01-07 RX ADMIN — KETOROLAC TROMETHAMINE 15 MG: 30 INJECTION, SOLUTION INTRAMUSCULAR; INTRAVENOUS at 12:01

## 2024-01-07 RX ADMIN — SODIUM CHLORIDE 500 ML: 9 INJECTION, SOLUTION INTRAVENOUS at 11:01

## 2024-01-07 RX ADMIN — CEFTRIAXONE 1 G: 1 INJECTION, POWDER, FOR SOLUTION INTRAMUSCULAR; INTRAVENOUS at 11:01

## 2024-01-07 NOTE — ED PROVIDER NOTES
Encounter Date: 1/7/2024    SCRIBE #1 NOTE: I, Nivia Helton, am scribing for, and in the presence of,  NOLAN Antunez.       History     Chief Complaint   Patient presents with    Abdominal Pain    Back Pain     Pt started with back pain on 12/27/2023  This am abdominal cramping began   Denies N/V/D  Denies fever        58 y.o. female with a PMH of GERD, CHF, Sciatica, who presents to the Emergency Department with complaints of R flank pain since 12/26/23. States her pain radiates to her lower abdomen, describes as cramping in character.  She also reports associated symptoms of decreased urine output.  She denies rash, fever, chest pain, SOB, numbness, weakness, tingling, abdominal pain, dysuria, hematuria, frequency, nausea, vomiting, diarrhea, or any other complaints.   She rates her pain as 8/10 and has not taken any medications for the symptoms.  No other Alleviating/aggravating factors.  Admits to tobacco abuse. PSHx significant for partial hysterectomy. No hx kidney stones.     The history is provided by the patient.     Review of patient's allergies indicates:   Allergen Reactions    Iodine and iodide containing products Swelling    Shellfish containing products Swelling     Past Medical History:   Diagnosis Date    Congestive heart failure     GERD (gastroesophageal reflux disease)     Sciatica      Past Surgical History:   Procedure Laterality Date    HYSTERECTOMY       History reviewed. No pertinent family history.  Social History     Tobacco Use    Smoking status: Some Days     Passive exposure: Never    Smokeless tobacco: Never   Substance Use Topics    Alcohol use: Yes     Comment: social    Drug use: Never     Review of Systems   Constitutional:  Negative for chills, fatigue and fever.   HENT:  Negative for congestion, ear pain, rhinorrhea, sore throat and trouble swallowing.    Eyes:  Negative for pain, discharge and redness.   Respiratory:  Negative for cough and shortness of breath.     Cardiovascular:  Negative for chest pain.   Gastrointestinal:  Negative for abdominal pain, diarrhea, nausea and vomiting.   Genitourinary:  Positive for decreased urine volume and flank pain (R). Negative for dysuria and frequency.   Musculoskeletal:  Negative for back pain, neck pain and neck stiffness.   Skin:  Negative for rash.   Neurological:  Negative for dizziness, weakness, light-headedness, numbness and headaches.   Psychiatric/Behavioral:  Negative for confusion.        Physical Exam     Initial Vitals [01/07/24 0953]   BP Pulse Resp Temp SpO2   (!) 164/93 81 20 98.6 °F (37 °C) 99 %      MAP       --         Physical Exam    Nursing note and vitals reviewed.  Constitutional: She appears well-developed.  Non-toxic appearance. She does not appear ill.   HENT:   Head: Normocephalic and atraumatic.   Right Ear: External ear normal.   Left Ear: External ear normal.   Nose: Nose normal.   Mouth/Throat: Oropharynx is clear and moist.   Eyes: Conjunctivae are normal.   Neck:   Normal range of motion.  Cardiovascular:  Normal rate and regular rhythm.           Pulmonary/Chest: Effort normal and breath sounds normal. She exhibits no tenderness.   Abdominal: Abdomen is soft. Bowel sounds are normal. There is no abdominal tenderness.   Soft non-tender abdomen with R flank tenderness, no CVA tenderness; no guarding or rebound; normal bowel sounds    No right CVA tenderness.  No left CVA tenderness. There is no rebound and no guarding.   Musculoskeletal:      Cervical back: Normal range of motion.     Neurological: She is alert and oriented to person, place, and time. Gait normal. GCS eye subscore is 4. GCS verbal subscore is 5. GCS motor subscore is 6.   Skin: Skin is warm, dry and intact. No rash noted.   Psychiatric: She has a normal mood and affect. Her speech is normal and behavior is normal. Judgment and thought content normal.         ED Course   Procedures  Labs Reviewed   POCT URINALYSIS W/O SCOPE -  Abnormal; Notable for the following components:       Result Value    Blood, UA Trace-lysed (*)     Protein, UA 1+ (*)     Leukocytes, UA 2+ (*)     All other components within normal limits   ISTAT PROCEDURE - Abnormal; Notable for the following components:    POC PCO2 48.7 (*)     POC PO2 38 (*)     All other components within normal limits   POCT CMP - Abnormal; Notable for the following components:    POC BUN 25 (*)     POC Glucose 120 (*)     All other components within normal limits   CULTURE, URINE   POCT CBC   SARS-COV-2 RDRP GENE    Narrative:     This test utilizes isothermal nucleic acid amplification technology to detect the SARS-CoV-2 RdRp nucleic acid segment. The analytical sensitivity (limit of detection) is 500 copies/swab.     A POSITIVE result is indicative of the presence of SARS-CoV-2 RNA; clinical correlation with patient history and other diagnostic information is necessary to determine patient infection status.    A NEGATIVE result means that SARS-CoV-2 nucleic acids are not present above the limit of detection. A NEGATIVE result should be treated as presumptive. It does not rule out the possibility of COVID-19 and should not be the sole basis for treatment decisions. If COVID-19 is strongly suspected based on clinical and exposure history, re-testing using an alternate molecular assay should be considered.     This test is only for use under the Food and Drug Administration s Emergency Use Authorization (EUA).     Commercial kits are provided by [employer]. Performance characteristics of the EUA have been independently verified by [facility] Department of Pathology and Laboratory Medicine.   _________________________________________________________________   The authorized Fact Sheet for Healthcare Providers and the authorized Fact Sheet for Patients of the ID NOW COVID-19 are available on the FDA website:    https://www.fda.gov/media/179075/download       https://www.fda.gov/media/739049/download      POCT URINALYSIS(INSTRUMENT)   POCT CMP   POCT LIVER PANEL   POCT LIVER PANEL          Imaging Results              CT Renal Stone Study ABD Pelvis WO (Final result)  Result time 01/07/24 11:33:16      Final result by Froilan Bearden Jr., MD (01/07/24 11:33:16)                   Impression:      Questionable 1 mm left pelvic ureteral stone without hydronephrosis.      Electronically signed by: Froilan Cardenas Jr  Date:    01/07/2024  Time:    11:33               Narrative:    EXAMINATION:  CT RENAL STONE STUDY ABD PELVIS WO    CLINICAL HISTORY:  Flank pain, kidney stone suspected;    TECHNIQUE:  Low dose axial images, sagittal and coronal reformations were obtained from the lung bases to the pubic symphysis.  Contrast was not administered.    COMPARISON:  None    FINDINGS:  Lung Bases: Unremarkable.    Kidneys/ Ureters: There may be a tiny 1 mm stone in the distal left pelvic ureter.  No hydronephrosis on either side.    Liver: Normal in size and attenuation, with no focal hepatic lesions.    Gallbladder: No calcified gallstones.    Bile Ducts: No evidence of dilated ducts.    Pancreas: No mass or peripancreatic fat stranding.    Spleen: Unremarkable.    Adrenals: Small low-density the left adrenal adenoma.  Right adrenal gland unremarkable.    Pelvic organs: Unremarkable.    GI Tract/Mesentery: Scattered sigmoid and left colonic diverticulosis without diverticulitis.  Otherwise unremarkable.    Retroperitoneum: No significant adenopathy.    Vasculature: No significant atherosclerosis or aneurysm.    Bones: Unremarkable.                                       Medications   cefTRIAXone (ROCEPHIN) 1 g in dextrose 5 % in water (D5W) 100 mL IVPB (MB+) (1 g Intravenous New Bag 1/7/24 1129)   sodium chloride 0.9% bolus 500 mL 500 mL (500 mLs Intravenous New Bag 1/7/24 1129)   ketorolac injection 15 mg (has no administration in time range)   tamsulosin 24 hr capsule 0.4  mg (has no administration in time range)     Medical Decision Making  This is an urgent evaluation of a 58 y.o. female that presents to the Emergency Department for flank pain. Associated symptoms include urinary frequency. The patient is a non-toxic, afebrile, and well appearing female. On physical exam, there is Soft non-tender abdomen with R flank tenderness, no CVA tenderness; no guarding or rebound; normal bowel sounds     Vital Signs: 144/64, 98.6, 68, 20, 99%   If available, previous records reviewed.   I ordered labs and personally reviewed them.  Labs significant for UA showed infection with culture pending; amylase 30, Lactate 0.77, BUN 25, creatinine 1.2, WBC 6.6, H&H 11&34, plt 201, COVID negative   I ordered CT scan and reviewed the radiologist interpretation.  CT significant for Questionable 1 mm left pelvic ureteral stone without hydronephrosis      Given the above findings, my overall impression is UTI, right flank pain, kidney stone. DDX: appendicitis, pancreatitis, mesenteric ischemia, obstruction, cholecystitis, peptic ulcer disease, diverticulitis, colitis, volvulus, hernia, UTI, gastritis and gastroenteritis    During her stay in the ED, the patient has been given rocephin, NS, toradol, flomax with good relief of her symptoms. The patient will be discharged home with Augmentin, zofran, percocet, toradol, flomax. Additional home care recommendations include Hydration. The diagnosis, treatment plan, instructions for follow-up, strict return precautions, and reevaluation with her Urology-referral placed as well as ED return precautions have been discussed with the patient and she has verbalized an understanding of the information.  All questions or concerns from the patient have been addressed.           Amount and/or Complexity of Data Reviewed  Labs: ordered. Decision-making details documented in ED Course.  Radiology: ordered.            Scribe Attestation:   Scribe #1: I performed the above  scribed service and the documentation accurately describes the services I performed. I attest to the accuracy of the note.                           I, KERRIE Antunez, personally performed the services described in this documentation. All medical record entries made by the scribe were at my direction and in my presence. I have reviewed the chart and agree that the record reflects my personal performance and is accurate and complete.      Clinical Impression:  Final diagnoses:  [N30.01] Acute cystitis with hematuria (Primary)  [N20.0] Kidney stone  [R10.9] Right flank pain          ED Disposition Condition    Discharge Stable          ED Prescriptions       Medication Sig Dispense Start Date End Date Auth. Provider    ketorolac (TORADOL) 10 mg tablet Take 1 tablet (10 mg total) by mouth after meals as needed for Pain. 15 tablet 1/7/2024 1/12/2024 Melanie Valenzuela FNP    ondansetron (ZOFRAN-ODT) 4 MG TbDL Take 1 tablet (4 mg total) by mouth every 8 (eight) hours as needed (nausea). 20 tablet 1/7/2024 1/14/2024 Melanie Valenzuela FNP    tamsulosin (FLOMAX) 0.4 mg Cap Take 1 capsule (0.4 mg total) by mouth once daily. for 10 days 10 capsule 1/8/2024 1/18/2024 Melanie Valenzuela FNP    amoxicillin-clavulanate 875-125mg (AUGMENTIN) 875-125 mg per tablet Take 1 tablet by mouth 2 (two) times daily. for 10 days 20 tablet 1/7/2024 1/17/2024 Melanie Valenzuela FNP    oxyCODONE-acetaminophen (PERCOCET) 5-325 mg per tablet Take 1 tablet by mouth every 6 (six) hours as needed for Pain. 12 tablet 1/7/2024 1/10/2024 Melanie Valenzuela FNP          Follow-up Information       Follow up With Specialties Details Why Contact Info Additional Information    [facility] - Urology Urology Schedule an appointment as soon as possible for a visit in 2 days  [address] [facility] [address]  [address]  [facility] [address] [phone] Please park in garage or Medical [facility] surface lot and check in at Medical Office [address].     [facility] ED  Emergency Medicine Go to  If symptoms worsen [address] [facility] [id number] [phone]              Melanie Valenzuela, FNP  01/07/24 1154

## 2024-01-07 NOTE — DISCHARGE INSTRUCTIONS
§ Please return to the Emergency Department for any new or worsening symptoms including: fever, chest pain, shortness of breath, loss of consciousness, dizziness, weakness, or any other concerns.     § Schedule an appointment for follow up with Urology as soon as possible for a recheck of your symptoms. If you do not have one, contact the one listed on your discharge paperwork or call the [facility] Appointment Desk at [phone] to schedule an appointment.     § If you require follow up care from a specialist and are unable to schedule an appointment with them directly, please contact your Primary Care Provider on the next business day to set up a referral.      § Please take all medication as prescribed.

## 2024-01-07 NOTE — Clinical Note
"Sofia Frenchdomitila Dunbar was seen and treated in our emergency department on 1/7/2024.  She may return to work on 01/10/2024.       If you have any questions or concerns, please don't hesitate to call.      Melanie Valenzuela, FNP"

## 2024-01-09 ENCOUNTER — TELEPHONE (OUTPATIENT)
Dept: UROLOGY | Facility: CLINIC | Age: 59
End: 2024-01-09
Payer: COMMERCIAL

## 2024-01-09 LAB — BACTERIA UR CULT: NORMAL

## 2024-01-09 NOTE — TELEPHONE ENCOUNTER
Pt was contacted pt made aware that she is out of network. Pt will reach out to insurance company.  ----- Message from Phyllis Belle MD sent at 1/9/2024  6:58 AM CST -----  Regarding: referral  Please offer to schedule appt for patient, if declined complete referral

## 2025-04-01 DIAGNOSIS — M54.30 SCIATICA, UNSPECIFIED LATERALITY: Primary | ICD-10-CM

## 2025-04-01 DIAGNOSIS — R06.02 SOB (SHORTNESS OF BREATH): Primary | ICD-10-CM

## 2025-04-11 ENCOUNTER — OFFICE VISIT (OUTPATIENT)
Dept: PAIN MEDICINE | Facility: CLINIC | Age: 60
End: 2025-04-11
Payer: COMMERCIAL

## 2025-04-11 VITALS — OXYGEN SATURATION: 97 % | DIASTOLIC BLOOD PRESSURE: 72 MMHG | SYSTOLIC BLOOD PRESSURE: 128 MMHG | HEART RATE: 74 BPM

## 2025-04-11 DIAGNOSIS — M54.16 LUMBAR RADICULOPATHY: ICD-10-CM

## 2025-04-11 DIAGNOSIS — G56.03 BILATERAL CARPAL TUNNEL SYNDROME: ICD-10-CM

## 2025-04-11 DIAGNOSIS — M54.9 DORSALGIA, UNSPECIFIED: Primary | ICD-10-CM

## 2025-04-11 DIAGNOSIS — M54.30 SCIATICA, UNSPECIFIED LATERALITY: ICD-10-CM

## 2025-04-11 PROCEDURE — 99999 PR PBB SHADOW E&M-EST. PATIENT-LVL IV: CPT | Mod: PBBFAC,,, | Performed by: STUDENT IN AN ORGANIZED HEALTH CARE EDUCATION/TRAINING PROGRAM

## 2025-04-11 RX ORDER — CYCLOBENZAPRINE HCL 5 MG
5 TABLET ORAL NIGHTLY PRN
Qty: 30 TABLET | Refills: 1 | Status: SHIPPED | OUTPATIENT
Start: 2025-04-11 | End: 2025-06-10

## 2025-04-11 RX ORDER — GABAPENTIN 300 MG/1
CAPSULE ORAL
Qty: 120 CAPSULE | Refills: 2 | Status: SHIPPED | OUTPATIENT
Start: 2025-04-11 | End: 2025-06-11

## 2025-04-11 NOTE — PATIENT INSTRUCTIONS
Gabapentin: 300 mg    Pronunciation: RITCHIE meade PEN tin  Brand: Gralise, Horizant, Neurontin    Morning Noon Night Schedule  0  0 1 Day 1 - 5  0  1 1 Day 6 - 10  1  1 1 Day 11 - 15  1  1 2 Day 16 onwards      Please review this entire sheet. It contains important information on: How to take this medication, side effects, precautions, drug interactions, what to do in an overdose, what do if a dose is missed and how to store the medication.  Increase the dose as instructed above only if you do not notice pain relief at a lower dose AND as long as you are not having side effects from the drug  If you notice pain relief at a lower dose, you do not need to continue increasing the dose.  If it is necessary to discontinue this drug, the dosage should be decreased the opposite way you started it. Do not stop taking this drug suddenly without your doctor's approval since there is a small, potential risk of withdrawl seizures.      Uses  This medication is used to treat seizure disorders (epilepsy).  However, we are prescribing it to treat your neuropathic pain.    How to Take this Medication  Take this medication by mouth exactly as prescribed. During the first few days your health care provider may gradually increase your dose to allow your body to adjust to the medication. To minimize side effects, take the very first dose at bedtime. For best effects, take this medication at evenly spaced times throughout the day and night. This will ensure a constant level of drug in your body. Do not take this more often or increase your dose without consulting your health care provider. Your condition will not improve any faster but the risk of serious side effects may be increased. Do not stop taking this drug suddenly without your health care provider's approval since there is a small, potential risk of withdrawal seizures.  If it is necessary to discontinue this drug, the dosage should be decreased the opposite way you started it.      Side Effects  This drug is usually well tolerated. Drowsiness, dizziness, unsteadiness, fatigue or nausea may occur. If these effects persist or worsen, notify your health care provider's. Unlikely to occur but report promptly:  tingling or numbness of the hands or feet, swelling of ankles, vision problems, fever, unusual bleeding, and mental or mood changes,  The FDA issued new information on Jan. 31, 2008, to alert health care professionals about an increased risk of suicidal thoughts and behaviors in patients who take this medication.    The FDA found that patients taking antiepileptics have about twice the risk of suicidal thoughts and behaviors, compared with patients receiving an inactive substance (placebo). This risk corresponds to an estimated 2.1 per 1,000 more patients in the drug treatment groups who experienced suicidality than in the placebo groups. Therefore, if you or a family member notice a change in your mood after starting or adjusting the dose of his medication, notify your doctor right away.     Very unlikely to occur but report promptly: fainting, difficulty moving, stiffness, uncontrolled movements, stomach or abdominal pain, leg pain, chest pain, trouble breathing.  If you notice other effects not listed above, contact your health care provider or pharmacist.     Precautions  Tell your health care provider your medical history, especially of: kidney disease, drug allergies. Use caution operating machinery or engaging in activities that require alertness. Avoid alcohol intake as it may intensify the dizziness/drowsiness effect of this drug. This medication should be used only when clearly needed during pregnancy. Discuss the risks and benefits with your health care provider. Gabapentin passes into breast milk. Because the effects of this drug on the nursing infant are not known, consult your health care provider before breast-feeding.     Get emergency medical help if you have any of these  "signs of an allergic reaction: hives; fever; swollen glands; painful sores in or around your eyes or mouth; difficulty breathing; swelling of your face, lips, tongue, or throat.  Report any new or worsening symptoms to your doctor, such as: mood or behavior changes, anxiety, depression, or if you feel agitated, hostile, restless, hyperactive (mentally or physically), or have thoughts about suicide or hurting yourself.    Interactions  Tell your health care provider of any over-the-counter or prescription medication you use, especially of: other medication for seizures, antacids. Because antacids may interfere with the absorption of this medication, it is best to take gabapentin at least 2 hours after taking an antacid. Do not take them at the same time. Do not start or stop any medicine without doctor or pharmacist approval.     Overdose  If overdose is suspected, contact your local poison control center or emergency room immediately.     Missed Dose  Try to take each dose at the scheduled time. If you miss a dose, take it as soon as remembered; do not take it if it is near the time for the next dose, instead, skip the missed dose and resume your usual dosing schedule. Do not "double-up" the dose to catch up.     Storage  Store this medication at room temperature between 59 and 86 degrees F (between 15 and 30 degrees C) away from heat and light.  Do not store in the bathroom. Keep this and all medications out of the reach of children. The oral solution form of this drug must be refrigerated between 36-46 degrees F (2-8 degrees C).     What is the most important information I should know about gabapentin?    You should not use this medication if you are allergic to gabapentin. Before taking gabapentin, tell your doctor if you have kidney, liver, or heart disease. You may have thoughts about suicide while taking gabapentin. Your doctor will need to check you at regular visits. Do not miss any scheduled appointments. " Report any new or worsening symptoms to your doctor, such as: mood or behavior changes, anxiety, depression, or if you feel agitated, hostile, restless, hyperactive (mentally or physically), or have thoughts about suicide or hurting yourself.  Do not stop taking gabapentin for seizures without first talking to your doctor, even if you feel fine. You may have increased seizures if you stop using gabapentin suddenly. You may need to use less and less before you stop the medication completely. Contact your doctor if your seizures get worse or you have them more often while taking gabapentin.   Wear a medical alert tag or carry an ID card stating that you take gabapentin. Any doctor, dentist, or emergency medical care provider who treats you should know that you take seizure medication.    What is gabapentin?  Gabapentin is an anti-epileptic medication, also called an anticonvulsant. It affects chemicals and nerves in the body that are involved in the cause of seizures and some types of pain.  Gabapentin is used alone or in combination with other medications to treat seizures caused by epilepsy in adults and children who are at least 12 years old. Gabapentin is also used with other medications to treat partial seizures in children who are 3 to 12 years old.  Gabapentin is also used in adults to treat nerve pain caused by herpes virus or shingles (herpes zoster), and to treat restless legs syndrome (RLS).  Gabapentin may also be used for purposes not listed in this medication guide.  What should I discuss with my healthcare provider before taking gabapentin?    You should not use this medication if you are allergic to gabapentin.  To make sure you can safely take gabapentin, tell your doctor if you have any of these other conditions:  kidney disease;  liver disease;  heart disease; or  (for patients with RLS) if you are a day sleeper or work a night shift.    You may have thoughts about suicide while taking this  medication. Tell your doctor if you have new or worsening depression or suicidal thoughts during the first several months of treatment, or whenever your dose is changed.    FDA pregnancy category C. It is not known whether gabapentin will harm an unborn baby. Tell your doctor if you are pregnant or plan to become pregnant while using this medication. Your family or other caregivers should also be alert to changes in your mood or symptoms. Your doctor will need to check you at regular visits. Do not miss any scheduled appointments. Gabapentin can pass into breast milk and may harm a nursing baby. Do not use this medication without telling your doctor if you are breast-feeding a baby.    How should I take gabapentin?  Take exactly as prescribed by your doctor. Do not take in larger or smaller amounts or for longer than recommended. Follow the directions on your prescription label.   The Horizant brand of gabapentin should not be taken during the day. For best results, take Horizant with food at about 5:00 in the evening.   The Neurontin brand of gabapentin can be taken with or without food.  If you break a tablet and take one half of it, take the other half at your next dose. Any tablet that has been broken should be used as soon as possible or within a few days.  Measure liquid medicine with a special dose-measuring spoon or cup, not a regular table spoon. If you do not have a dose-measuring device, ask your pharmacist for one.  Do not stop taking gabapentin for seizures without first talking to your doctor, even if you feel fine. You may have increased seizures if you stop using gabapentin suddenly. You may need to use less and less before you stop the medication completely.  Contact your doctor if your seizures get worse or you have them more often while taking gabapentin.  Wear a medical alert tag or carry an ID card stating that you take gabapentin. Any doctor, dentist, or emergency medical care provider who  treats you should know that you take seizure medication.  Use gabapentin regularly to get the most benefit. Get your prescription refilled before you run out of medicine completely.    This medication can cause unusual results with certain medical tests. Tell any doctor who treats you that you are using gabapentin.    Store gabapentin tablets and capsules at room temperature away from light and moisture.    Store the liquid medicine in the refrigerator. Do not freeze.    What happens if I miss a dose?  Take the missed dose as soon as you remember. Skip the missed dose if it is almost time for your next scheduled dose. Do not take extra medicine to make up the missed dose.    What happens if I overdose?    Seek emergency medical attention or call the Poison Help line at [phone]. Overdose symptoms may include blurred vision, drowsiness, weakness, slurred speech, or diarrhea.    What should I avoid while taking gabapentin?    This medication may cause blurred vision or impair your thinking or reactions. Be careful if you drive or do anything that requires you to be alert and able to see clearly.  Avoid taking an antacid within 2 hours before or after you take gabapentin. Antacids can make it harder for your body to absorb gabapentin.    Where can I get more information?  Your pharmacist can provide more information about gabapentin.    Remember, keep this and all other medicines out of the reach of children, never share your medicines with others, and use this medication only for the indication prescribed.

## 2025-04-11 NOTE — PROGRESS NOTES
Chronic Pain - New Consult    Referring Physician: Benita Gracia MD    Date: 04/11/2025     Re: Sofia Dunbar  MR#: [id number]  YOB: 1965  Age: 59 y.o.    Chief Complaint:   Chief Complaint   Patient presents with    Back Pain     **This note is dictated using the M*Modal Fluency Direct word recognition program. There are word recognition mistakes that are occasionally missed on review.**    ASSESSMENT: 59 y.o. year old female with back and left leg pain, consistent with     1. Dorsalgia, unspecified  X-Ray Lumbar Complete Including Flex And Ext    MRI Lumbar Spine Without Contrast    Ambulatory Referral/Consult to Physical Therapy    cyclobenzaprine (FLEXERIL) 5 MG tablet      2. Sciatica, unspecified laterality  Ambulatory referral/consult to Pain Clinic    gabapentin (NEURONTIN) 300 MG capsule      3. Lumbar radiculopathy  X-Ray Lumbar Complete Including Flex And Ext    MRI Lumbar Spine Without Contrast    Ambulatory Referral/Consult to Physical Therapy    cyclobenzaprine (FLEXERIL) 5 MG tablet    gabapentin (NEURONTIN) 300 MG capsule      4. Bilateral carpal tunnel syndrome  gabapentin (NEURONTIN) 300 MG capsule          PLAN:     Left lumbar radiculopathy  -suspect she has a radiculopathy affecting the L3 or 4 region.   -MRI lumbar  -XR lumbar  -PT referral  -Start gabapentin and titrate to 300-300-600  -Flexeril Prn at night    CKD3  -avoid nephrotoxic meidcations    - RTC after MRI  - Counseled patient regarding the importance of weight loss and activity modification and physical therapy.    The above plan and management options were discussed at length with patient. Patient is in agreement with the above and verbalized understanding. It will be communicated with the referring physician via electronic record, fax, or mail.  Lab/study reports reviewed were important and necessary because subsequent medical and treatment recommendations required review of the above lab/study reports. Images  viewed/reviewed above were important and necessary because subsequent medical and treatment recommendations required review of the reviewed image(s).     Electronically signed by:  Adam Herr DO  04/11/2025    =========================================================================================================    SUBJECTIVE:    Sofia Dunbar is a 59 y.o. female presents to the clinic for the evaluation of lower back pain. The pain started 1 year ago following no inciting event and symptoms have been worsening. She states that sometimes she can hardly walk because the pain is so bad.  The pain is in the left low back and goes down the back and side of the leg and goes around the kneecap to her ankle.  It has been getting worse.  So far she has tried some HEP from her PCP. That did not help.  Then ibuprofen which helps but should not take because of her CHF.  She has tried methocarbamol (does not help). She stopped the methocarbamol. Flexeril works better, it makes her pretty sleepy. She states that her back and her thigh hurts the most. She has not had any imaging of the low back.  She has some old gabapentin    Pain Description:    The pain is located in the lower back area and radiates to the left leg .    At BEST  4/10   At WORST  8/10 on the WORST day.    On average pain is rated as 6/10.   Today the pain is rated as 5/10  The pain is continuous.  The pain is described as burning, numbing, sharp, and tight band    Symptoms interfere with daily activity, sleeping, and work.   Exacerbating factors: Standing and Bending.    Mitigating factors medications.   She reports 5 hours of sleep per night.    Physical Therapy/Home Exercise: Yes, currently has a home exercise program    Current Pain Medications:    - flexeril qhs prn    Failed Pain Medications:    - cannot take NSAIDS    Pain Treatment Therapies:    Pain procedures: none  Physical Therapy: noen  Chiropractor: none  Acupuncture: none  TENS  unit: none  Spinal decompression: none  Joint replacement: none    Patient denies urinary incontinence and bowel incontinence.  Patient denies any suicidal or homicidal ideations     report:  Reviewed and consistent with medication use as prescribed.    Imaging: N/A        4/11/2025    11:40 AM   Pain Disability Index (PDI)   Family/Home Responsibilities: 7   Recreation: 9   Occupation: 7   Sexual Behavior: 0   Self Care: 5   Life-Support Activities: 6   Pain Disability Index (PDI) 39        Past Medical History:   Diagnosis Date    Congestive heart failure     GERD (gastroesophageal reflux disease)     Sciatica      Past Surgical History:   Procedure Laterality Date    HYSTERECTOMY       Social History[1]  No family history on file.    Review of patient's allergies indicates:   Allergen Reactions    Shellfish containing products Swelling       Current Outpatient Medications   Medication Sig    amlodipine (NORVASC) 5 MG tablet     ammonium lactate 12 % Crea Apply 1 application topically once daily.    aspirin (ECOTRIN) 81 MG EC tablet Take 81 mg by mouth once daily.    atorvastatin (LIPITOR) 20 MG tablet     carvedilol (COREG) 12.5 MG tablet     diclofenac sodium (VOLTAREN) 1 % Gel Apply 2 g topically once daily.    furosemide (LASIX) 20 MG tablet     ketoconazole (NIZORAL) 2 % cream Apply topically once daily.    potassium chloride (KLOR-CON) 10 MEQ TbSR Take 20 mEq by mouth once daily.    cyclobenzaprine (FLEXERIL) 5 MG tablet Take 1 tablet (5 mg total) by mouth nightly as needed for Muscle spasms.    gabapentin (NEURONTIN) 300 MG capsule Work up to 300mg in the morning, 300mg in the afternoon, and 600mg before bed.    tamsulosin (FLOMAX) 0.4 mg Cap Take 1 capsule (0.4 mg total) by mouth once daily. for 10 days     No current facility-administered medications for this visit.     REVIEW OF SYSTEMS:    GENERAL:  No weight loss, malaise or fevers.  HEENT:   No recent changes in vision or hearing  NECK:  Negative  for lumps, no difficulty with swallowing.  RESPIRATORY:  Negative for cough, wheezing or shortness of breath, patient denies any recent URI.  CARDIOVASCULAR:  Negative for chest pain, leg swelling or palpitations.  GI:  Negative for abdominal discomfort, blood in stools or black stools or change in bowel habits.  MUSCULOSKELETAL:  See HPI.  SKIN:  Negative for lesions, rash, and itching.  PSYCH:  No mood disorder or recent psychosocial stressors.  Patients sleep is not disturbed secondary to pain.  HEMATOLOGY/LYMPHOLOGY:  Negative for prolonged bleeding, bruising easily or swollen nodes.  Patient is not currently taking any anti-coagulants  NEURO:   No history of headaches, syncope, paralysis, seizures or tremors.  All other reviewed and negative other than HPI.    OBJECTIVE:    /72 (BP Location: Left arm, Patient Position: Sitting)   Pulse 74   SpO2 97%     PHYSICAL EXAMINATION:    GENERAL: Well appearing, in no acute distress, alert and oriented x3.  PSYCH:  Mood and affect appropriate.  SKIN: Skin color, texture, turgor normal, no rashes or lesions.  HEAD/FACE:  Normocephalic, atraumatic. Cranial nerves grossly intact.    NECK:   - Did not perform pain to palpation over the cervical paraspinous muscles.   - Spurling  Did not perform.  - Did not perform pain with neck flexion, extension, or lateral flexion.     CV: RRR with palpation of the radial artery.  PULM: CTAB. No evidence of respiratory difficulty, symmetric chest rise.  GI:  Soft and non-tender.    BACK:   - No obvious deformity or signs of trauma, Normal lumbar lordotic curve  - Negative spinous process tenderness  - Positive paravertebral tenderness  - Positive pain to palpation over the facet joints of the lumbar spine.   - Positive QL / Iliac crest / Glut tenderness  - Slump test is Negative for radicular pain  - Slump test is Negative for back pain  - Supine Straight leg raising is mild Positive for radicular pain  - Supine Straight leg  raising is Negative for back pain  - Lumbar ROM is diminished in Flexion with pain  - Lumbar ROM is normal in Extension without pain  - Did not perform Sustained Hip Flexion test (for discogenic pain)  - Positive Altered Gait, Posture  - Axial facet loading test Negative on the bilateral side(s)    SI Joint exam:  - Positive SI joint tenderness to palpation  - Olvin's sign Negative  - Yeoman's Test: Did not perform for SI joint pain indicating anterior SI ligament involvement. Did not perform for anterior thigh pain/paresthesia which indicates femoral nerve stretch.  - Gaenslen's Test:Negative  - Finger Bhavna's Sign:Negative  - SI compression test:Did not perform  - SI distraction test:Negative  - Thigh Thrust: Negative  - SI Thrust: Did not perform    MUSKULOSKELETAL:    EXTREMITIES:   Hip Exam:  - Log Roll Negative  - FADIR Did not perform  - Stinchfield Did not perform  - Hip Scour Did not perform  - GTB Tenderness Negative    NEUROLOGICAL EXAM:  MENTAL STATUS: A x O x 3, good concentration, speech is fluent and goal directed  MEMORY: recent and remote are intact  CN: CN2-12 grossly intact  MOTOR: 5/5 in all muscle groups  DTRs: 0+ intact symmetric  Sensation:    -no Loss of sensation in a left lower and right lower  leg  distribution.       [1]   Social History  Socioeconomic History    Marital status: Single   Tobacco Use    Smoking status: Some Days     Passive exposure: Never    Smokeless tobacco: Never   Substance and Sexual Activity    Alcohol use: Yes     Comment: social    Drug use: Never     Social Drivers of Health     Financial Resource Strain: High Risk (8/8/2024)    Received from [facility]    Overall Financial Resource Strain (CARDIA)     Difficulty of Paying Living Expenses: Hard   Food Insecurity: No Food Insecurity (8/8/2024)    Received from [facility]    Hunger Vital Sign     Worried About Running Out of Food in the Last Year: Never true     Ran Out of Food in the Last Year: Never true    Transportation Needs: No Transportation Needs (8/8/2024)    Received from [facility]    PRAPARE - Transportation     Lack of Transportation (Medical): No     Lack of Transportation (Non-Medical): No   Physical Activity: Sufficiently Active (8/8/2024)    Received from [facility]    Exercise Vital Sign     Days of Exercise per Week: 7 days     Minutes of Exercise per Session: 30 min   Stress: Stress Concern Present (8/8/2024)    Received from [facility]    [employer] - Occupational Stress Questionnaire     Feeling of Stress : Very much   Housing Stability: Low Risk  (8/8/2024)    Received from [facility]    Housing Stability Vital Sign     Unable to Pay for Housing in the Last Year: No     Number of Places Lived in the Last Year: 1     Unstable Housing in the Last Year: No

## 2025-04-28 ENCOUNTER — RESULTS FOLLOW-UP (OUTPATIENT)
Dept: PAIN MEDICINE | Facility: CLINIC | Age: 60
End: 2025-04-28

## 2025-04-28 ENCOUNTER — HOSPITAL ENCOUNTER (OUTPATIENT)
Dept: RADIOLOGY | Facility: HOSPITAL | Age: 60
Discharge: HOME | End: 2025-04-28
Attending: STUDENT IN AN ORGANIZED HEALTH CARE EDUCATION/TRAINING PROGRAM
Payer: COMMERCIAL

## 2025-04-28 DIAGNOSIS — M54.16 LUMBAR RADICULOPATHY: ICD-10-CM

## 2025-04-28 DIAGNOSIS — M54.9 DORSALGIA, UNSPECIFIED: ICD-10-CM

## 2025-04-28 PROCEDURE — 72114 X-RAY EXAM L-S SPINE BENDING: CPT | Mod: 26,,, | Performed by: RADIOLOGY

## 2025-04-28 PROCEDURE — 72148 MRI LUMBAR SPINE W/O DYE: CPT | Mod: 26,,, | Performed by: RADIOLOGY

## 2025-04-28 PROCEDURE — 72148 MRI LUMBAR SPINE W/O DYE: CPT | Mod: TC

## 2025-04-28 PROCEDURE — 72114 X-RAY EXAM L-S SPINE BENDING: CPT | Mod: TC,FY

## 2025-05-02 ENCOUNTER — OFFICE VISIT (OUTPATIENT)
Dept: PAIN MEDICINE | Facility: CLINIC | Age: 60
End: 2025-05-02
Payer: COMMERCIAL

## 2025-05-02 VITALS — HEART RATE: 75 BPM | DIASTOLIC BLOOD PRESSURE: 72 MMHG | SYSTOLIC BLOOD PRESSURE: 142 MMHG | OXYGEN SATURATION: 95 %

## 2025-05-02 DIAGNOSIS — M54.16 LUMBAR RADICULOPATHY: Primary | ICD-10-CM

## 2025-05-02 PROCEDURE — 99999 PR PBB SHADOW E&M-EST. PATIENT-LVL III: CPT | Mod: PBBFAC,,, | Performed by: STUDENT IN AN ORGANIZED HEALTH CARE EDUCATION/TRAINING PROGRAM

## 2025-05-02 NOTE — PROGRESS NOTES
Chronic Pain - f/u    Referring Physician: No ref. provider found    Date: 05/02/2025     Re: Sofia Dunbar  MR#: [id number]  YOB: 1965  Age: 59 y.o.    Chief Complaint:   Chief Complaint   Patient presents with    Follow-up     **This note is dictated using the M*Modal Fluency Direct word recognition program. There are word recognition mistakes that are occasionally missed on review.**    ASSESSMENT: 59 y.o. year old female with back and left leg pain, consistent with     1. Lumbar radiculopathy            PLAN:     Left lumbar radiculopathy  -suspect she has a radiculopathy affecting the L3 or 4 region.   -MRI lumbar shows multilevel stenosis.   -discussed  left L2-3 and L4-5 TFESI. This is my recommendation of a procedure.  She wants to think about it  -PT referral - starts next month  -start gabapentin at 300-300-600 dosing scheule  -continue Flexeril Prn at night    CKD3  -avoid nephrotoxic meidcations    - RTCend of june  - Counseled patient regarding the importance of weight loss and activity modification and physical therapy.    The above plan and management options were discussed at length with patient. Patient is in agreement with the above and verbalized understanding. It will be communicated with the referring physician via electronic record, fax, or mail.  Lab/study reports reviewed were important and necessary because subsequent medical and treatment recommendations required review of the above lab/study reports. Images viewed/reviewed above were important and necessary because subsequent medical and treatment recommendations required review of the reviewed image(s).     Electronically signed by:  Adam Herr DO  05/02/2025    =========================================================================================================    SUBJECTIVE:    Interval History 5/2/2025:     Sofia Dunbar is a 59 y.o. female presents to the clinic for follow up.  Since last visit the  pain has has worsened slightly.    The pain is located in the lower back area and does not radiate.  The pain is described as aching    At BEST  3/10   At WORST  6/10 on the WORST day.    On average pain is rated as 4/10.   Today the pain is rated as 4/10  Symptoms interfere with daily activity, sleeping, and work.   Exacerbating factors: Night Time.    Mitigating factors medications.     Current pain medications: gabapentin 300-300-600, flexeril  Failed Pain Medications:  cannot take NSAIDS    Initial hx:  Sofia Dunbar is a 59 y.o. female presents to the clinic for the evaluation of lower back pain. The pain started 1 year ago following no inciting event and symptoms have been worsening. She states that sometimes she can hardly walk because the pain is so bad.  The pain is in the left low back and goes down the back and side of the leg and goes around the kneecap to her ankle.  It has been getting worse.  So far she has tried some HEP from her PCP. That did not help.  Then ibuprofen which helps but should not take because of her CHF.  She has tried methocarbamol (does not help). She stopped the methocarbamol. Flexeril works better, it makes her pretty sleepy. She states that her back and her thigh hurts the most. She has not had any imaging of the low back.  She has some old gabapentin    Pain Description:    The pain is located in the lower back area and radiates to the left leg.    At BEST  4/10   At WORST  8/10 on the WORST day.    On average pain is rated as 6/10.   Today the pain is rated as 5/10  The pain is continuous.  The pain is described as burning, numbing, sharp, and tight band    Symptoms interfere with daily activity, sleeping, and work.   Exacerbating factors: Standing and Bending.    Mitigating factors medications.   She reports 5 hours of sleep per night.    Physical Therapy/Home Exercise: Yes, currently has a home exercise program    Current Pain Medications:    - flexeril qhs prn    Failed  Pain Medications:    - cannot take NSAIDS    Pain Treatment Therapies:    Pain procedures: none  Physical Therapy: noen  Chiropractor: none  Acupuncture: none  TENS unit: none  Spinal decompression: none  Joint replacement: none    Patient denies urinary incontinence and bowel incontinence.  Patient denies any suicidal or homicidal ideations     report:  Reviewed and consistent with medication use as prescribed.    Imaging:   LUMBAR X-RAY  Results for orders placed during the hospital encounter of 04/28/25    X-Ray Lumbar Complete Including Flex And Ext    Narrative  EXAMINATION:  XR LUMBAR SPINE 5 VIEW WITH FLEX AND EXT    CLINICAL HISTORY:  Dorsalgia, unspecified    TECHNIQUE:  Five views of the lumbar spine plus flexion extension views were performed.    COMPARISON:  None.    FINDINGS:  Mild DJD.  There is a grade 1 L4/L5 anterolisthesis and the few mm L3/L4 anterolisthesis identified.  The disc spaces are narrowed between L3 and S1 vertebral segment.  No fracture or dislocation.  No bone destruction identified    Impression  See above      Electronically signed by: Donnell Fitzgerald MD  Date:    04/28/2025  Time:    08:37     LUMBAR MRI  Results for orders placed during the hospital encounter of 04/28/25    MRI Lumbar Spine Without Contrast    Narrative  EXAMINATION:  MRI LUMBAR SPINE WITHOUT CONTRAST    CLINICAL HISTORY:  Dorsalgia, unspecifiedLow back pain, symptoms persist with > 6wks conservative treatment;left L4 radiculopathy;    TECHNIQUE:  Sagittal T1, sagittal T2, sagittal STIR, axial T1 and axial T2 weighted images of the lumbar spine obtained without contrast.    COMPARISON:  04/28/2025    FINDINGS:  There is multilevel degenerative anterolisthesis of L3 on L4, L4 on L5 and L5 retrolisthesis on S1.  The vertebral body heights are well maintained, with no fracture.  There is Modic type 2 degenerative endplate signal abnormality at L5-S1 and L1-L2.  Schmorl's nodes are present involving the inferior  endplate of L3 and L5.    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  There is also some proliferation of lumbar epidural fat.    The conus is normal in appearance.  The adjacent soft tissue structures show no significant abnormalities.    L1-L2: Disc desiccation, circumferential disc bulge and mild facet arthropathy.  Epidural lipomatosis.  Moderate-severe central canal stenosis.    L2-L3: Disc bulge, facet arthropathy, ligamentum flavum thickening and epidural lipomatosis.  Severe central canal stenosis and severe left foraminal stenosis.    L3-L4: Circumferential disc bulge facet arthropathy, ligamentum flavum thickening and epidural lipomatosis.  Moderate-severe central canal, moderate right and mild left foraminal stenosis.    L4-L5: Severe facet arthritis, circumferential disc bulge and ligamentum flavum thickening.  Moderate central canal and mild left foraminal stenosis.    L5-S1: Circumferential disc bulge and facet arthritis, asymmetric to the right.  Mild right foraminal stenosis.  No central canal stenosis.    Impression  Extradural degenerative change, epidural lipomatosis and congenital canal narrowing contribute to multilevel moderate to severe central canal and varying degrees of foraminal stenosis as detailed above.      Electronically signed by: Froilan Cardenas Jr  Date:    04/28/2025  Time:    14:09            5/2/2025     3:46 PM 4/11/2025    11:40 AM   Pain Disability Index (PDI)   Family/Home Responsibilities: 5 7   Recreation: 5 9   Occupation: 6 7   Sexual Behavior: 0 0   Self Care: 5 5   Life-Support Activities: 0 6   Pain Disability Index (PDI) 21 39        Past Medical History:   Diagnosis Date    Congestive heart failure     GERD (gastroesophageal reflux disease)     Sciatica      Past Surgical History:   Procedure Laterality Date    HYSTERECTOMY       Social History[1]  No family history on  file.    Review of patient's allergies indicates:   Allergen Reactions    Shellfish containing products Swelling       Current Outpatient Medications   Medication Sig    amlodipine (NORVASC) 5 MG tablet     ammonium lactate 12 % Crea Apply 1 application topically once daily.    aspirin (ECOTRIN) 81 MG EC tablet Take 81 mg by mouth once daily.    atorvastatin (LIPITOR) 20 MG tablet     carvedilol (COREG) 12.5 MG tablet     cyclobenzaprine (FLEXERIL) 5 MG tablet Take 1 tablet (5 mg total) by mouth nightly as needed for Muscle spasms.    diclofenac sodium (VOLTAREN) 1 % Gel Apply 2 g topically once daily.    furosemide (LASIX) 20 MG tablet     gabapentin (NEURONTIN) 300 MG capsule Work up to 300mg in the morning, 300mg in the afternoon, and 600mg before bed.    ketoconazole (NIZORAL) 2 % cream Apply topically once daily.    potassium chloride (KLOR-CON) 10 MEQ TbSR Take 20 mEq by mouth once daily.    tamsulosin (FLOMAX) 0.4 mg Cap Take 1 capsule (0.4 mg total) by mouth once daily. for 10 days     No current facility-administered medications for this visit.     REVIEW OF SYSTEMS:    GENERAL:  No weight loss, malaise or fevers.  HEENT:   No recent changes in vision or hearing  NECK:  Negative for lumps, no difficulty with swallowing.  RESPIRATORY:  Negative for cough, wheezing or shortness of breath, patient denies any recent URI.  CARDIOVASCULAR:  Negative for chest pain, leg swelling or palpitations.  GI:  Negative for abdominal discomfort, blood in stools or black stools or change in bowel habits.  MUSCULOSKELETAL:  See HPI.  SKIN:  Negative for lesions, rash, and itching.  PSYCH:  No mood disorder or recent psychosocial stressors.  Patients sleep is not disturbed secondary to pain.  HEMATOLOGY/LYMPHOLOGY:  Negative for prolonged bleeding, bruising easily or swollen nodes.  Patient is not currently taking any anti-coagulants  NEURO:   No history of headaches, syncope, paralysis, seizures or tremors.  All other  reviewed and negative other than HPI.    OBJECTIVE:    BP (!) 142/72 (BP Location: Left arm, Patient Position: Sitting)   Pulse 75   SpO2 95%     PHYSICAL EXAMINATION:    GENERAL: Well appearing, in no acute distress, alert and oriented x3.  PSYCH:  Mood and affect appropriate.  SKIN: Skin color, texture, turgor normal, no rashes or lesions.  HEAD/FACE:  Normocephalic, atraumatic. Cranial nerves grossly intact.    NECK:   - Did not perform pain to palpation over the cervical paraspinous muscles.   - Spurling  Did not perform.  - Did not perform pain with neck flexion, extension, or lateral flexion.     CV: RRR with palpation of the radial artery.  PULM: CTAB. No evidence of respiratory difficulty, symmetric chest rise.  GI:  Soft and non-tender.    BACK:   - No obvious deformity or signs of trauma, Normal lumbar lordotic curve  - Negative spinous process tenderness  - Positive paravertebral tenderness  - Positive pain to palpation over the facet joints of the lumbar spine.   - Positive QL / Iliac crest / Glut tenderness  - Slump test is Negative for radicular pain  - Slump test is Negative for back pain  - Supine Straight leg raising is mild Positive for radicular pain  - Supine Straight leg raising is Negative for back pain  - Lumbar ROM is diminished in Flexion with pain  - Lumbar ROM is normal in Extension without pain  - Did not perform Sustained Hip Flexion test (for discogenic pain)  - Positive Altered Gait, Posture  - Axial facet loading test Negative on the bilateral side(s)    SI Joint exam:  - Positive SI joint tenderness to palpation  - Olvin's sign Negative  - Yeoman's Test: Did not perform for SI joint pain indicating anterior SI ligament involvement. Positive for anterior thigh pain/paresthesia which indicates femoral nerve stretch.  - Gaenslen's Test:Negative  - Finger Bhavna's Sign:Negative  - SI compression test:Did not perform  - SI distraction test:Negative  - Thigh Thrust: Negative  - SI  Thrust: Did not perform    MUSKULOSKELETAL:    EXTREMITIES:   Hip Exam:  - Log Roll Negative  - FADIR Did not perform  - Stinchfield Did not perform  - Hip Scour Did not perform  - GTB Tenderness Negative    NEUROLOGICAL EXAM:  MENTAL STATUS: A x O x 3, good concentration, speech is fluent and goal directed  MEMORY: recent and remote are intact  CN: CN2-12 grossly intact  MOTOR: 5/5 in all muscle groups  DTRs: 0+ intact symmetric  Sensation:    -no Loss of sensation in a left lower and right lower leg distribution.           [1]   Social History  Socioeconomic History    Marital status: Single   Tobacco Use    Smoking status: Some Days     Passive exposure: Never    Smokeless tobacco: Never   Substance and Sexual Activity    Alcohol use: Yes     Comment: social    Drug use: Never     Social Drivers of Health     Financial Resource Strain: High Risk (8/8/2024)    Received from [facility]    Overall Financial Resource Strain (CARDIA)     Difficulty of Paying Living Expenses: Hard   Food Insecurity: No Food Insecurity (8/8/2024)    Received from [facility]    Hunger Vital Sign     Worried About Running Out of Food in the Last Year: Never true     Ran Out of Food in the Last Year: Never true   Transportation Needs: No Transportation Needs (8/8/2024)    Received from [facility]    PRAPARE - Transportation     Lack of Transportation (Medical): No     Lack of Transportation (Non-Medical): No   Physical Activity: Sufficiently Active (8/8/2024)    Received from [facility]    Exercise Vital Sign     Days of Exercise per Week: 7 days     Minutes of Exercise per Session: 30 min   Stress: Stress Concern Present (8/8/2024)    Received from [facility]    [employer] - Occupational Stress Questionnaire     Feeling of Stress : Very much   Housing Stability: Low Risk  (8/8/2024)    Received from [facility]    Housing Stability Vital Sign     Unable to Pay for Housing in the Last Year: No     Number  of Places Lived in the Last Year: 1     Unstable Housing in the Last Year: No

## 2025-05-08 ENCOUNTER — HOSPITAL ENCOUNTER (OUTPATIENT)
Dept: CARDIOLOGY | Facility: HOSPITAL | Age: 60
Discharge: HOME | End: 2025-05-08
Attending: PHYSICIAN ASSISTANT
Payer: COMMERCIAL

## 2025-05-08 VITALS
HEIGHT: 63 IN | SYSTOLIC BLOOD PRESSURE: 186 MMHG | DIASTOLIC BLOOD PRESSURE: 79 MMHG | WEIGHT: 233 LBS | BODY MASS INDEX: 41.29 KG/M2 | HEART RATE: 75 BPM

## 2025-05-08 DIAGNOSIS — R06.02 SOB (SHORTNESS OF BREATH): ICD-10-CM

## 2025-05-08 LAB
CV STRESS BASE HR: 75 BPM
DIASTOLIC BLOOD PRESSURE: 79 MMHG
OHS CV CPX 1 MINUTE RECOVERY HEART RATE: 105 BPM
OHS CV CPX 85 PERCENT MAX PREDICTED HEART RATE MALE: 137
OHS CV CPX DATA GRADE - PEAK: 2.9
OHS CV CPX DATA O2 SAT - PEAK: 98
OHS CV CPX DATA O2 SAT - REST: 97
OHS CV CPX DATA SPEED - PEAK: 2.4
OHS CV CPX DATA TIME - PEAK: 4.35
OHS CV CPX DATA VE/VCO2 - PEAK: 39
OHS CV CPX DATA VE/VO2 - PEAK: 29
OHS CV CPX DATA VO2 - PEAK: 15.8
OHS CV CPX DATA VO2 - REST: 3.4
OHS CV CPX FEV1/FVC: 0.8
OHS CV CPX FORCED EXPIRATORY VOLUME: 1.42
OHS CV CPX FORCED VITAL CAPACITY (FVC): 1.77
OHS CV CPX HIGHEST VO: 30.2
OHS CV CPX MAX PREDICTED HEART RATE: 161
OHS CV CPX MAXIMAL VOLUNTARY VENTILATION (MVV) PREDICTED: 56.8
OHS CV CPX MAXIMAL VOLUNTARY VENTILATION (MVV): 21
OHS CV CPX MAXIUMUM EXERCISE VENTILATION (VE MAX): 40.6
OHS CV CPX PATIENT AGE: 59
OHS CV CPX PATIENT HEIGHT IN: 63
OHS CV CPX PATIENT IS FEMALE AGE 11-19: 0
OHS CV CPX PATIENT IS FEMALE AGE GREATER THAN 19: 1
OHS CV CPX PATIENT IS FEMALE AGE LESS THAN 11: 0
OHS CV CPX PATIENT IS FEMALE: 1
OHS CV CPX PATIENT IS MALE AGE 11-25: 0
OHS CV CPX PATIENT IS MALE AGE GREATER THAN 25: 0
OHS CV CPX PATIENT IS MALE AGE LESS THAN 11: 0
OHS CV CPX PATIENT IS MALE GREATER THAN 18: 0
OHS CV CPX PATIENT IS MALE LESS THAN OR EQUAL TO 18: 0
OHS CV CPX PATIENT IS MALE: 0
OHS CV CPX PATIENT WEIGHT RETURNED IN OZ: 3728.42
OHS CV CPX PEAK DIASTOLIC BLOOD PRESSURE: 97 MMHG
OHS CV CPX PEAK HEAR RATE: 117 BPM
OHS CV CPX PEAK RATE PRESSURE PRODUCT: NORMAL
OHS CV CPX PEAK SYSTOLIC BLOOD PRESSURE: 169 MMHG
OHS CV CPX PERCENT BODY FAT: 28.2
OHS CV CPX PERCENT MAX PREDICTED HEART RATE ACHIEVED: 76
OHS CV CPX PREDICTED VO2: 30.2 ML/KG/MIN
OHS CV CPX RATE PRESSURE PRODUCT PRESENTING: NORMAL
OHS CV CPX REST PET CO2: 27
OHS CV CPX VE/VCO2 SLOPE: 44.2
STRESS ECHO POST EXERCISE DUR MIN: 4 MINUTES
STRESS ECHO POST EXERCISE DUR SEC: 21 SECONDS
SYSTOLIC BLOOD PRESSURE: 186 MMHG

## 2025-05-08 PROCEDURE — 94621 CARDIOPULM EXERCISE TESTING: CPT

## 2025-05-08 PROCEDURE — 94621 CARDIOPULM EXERCISE TESTING: CPT | Mod: 26,,, | Performed by: INTERNAL MEDICINE
